# Patient Record
Sex: FEMALE | Race: WHITE | Employment: FULL TIME | URBAN - METROPOLITAN AREA
[De-identification: names, ages, dates, MRNs, and addresses within clinical notes are randomized per-mention and may not be internally consistent; named-entity substitution may affect disease eponyms.]

---

## 2020-03-23 ENCOUNTER — OFFICE VISIT (OUTPATIENT)
Dept: INTERNAL MEDICINE CLINIC | Age: 25
End: 2020-03-23
Payer: COMMERCIAL

## 2020-03-23 VITALS
HEART RATE: 75 BPM | TEMPERATURE: 98.6 F | OXYGEN SATURATION: 98 % | WEIGHT: 241 LBS | DIASTOLIC BLOOD PRESSURE: 84 MMHG | SYSTOLIC BLOOD PRESSURE: 118 MMHG | RESPIRATION RATE: 16 BRPM | HEIGHT: 67 IN | BODY MASS INDEX: 37.83 KG/M2

## 2020-03-23 PROBLEM — E66.9 OBESITY (BMI 35.0-39.9 WITHOUT COMORBIDITY): Status: ACTIVE | Noted: 2020-03-23

## 2020-03-23 PROCEDURE — 99204 OFFICE O/P NEW MOD 45 MIN: CPT | Performed by: INTERNAL MEDICINE

## 2020-03-23 RX ORDER — TOPIRAMATE 25 MG/1
25 TABLET ORAL NIGHTLY
Qty: 60 TABLET | Refills: 1 | Status: SHIPPED | OUTPATIENT
Start: 2020-03-23 | End: 2020-05-28

## 2020-03-23 SDOH — ECONOMIC STABILITY: TRANSPORTATION INSECURITY
IN THE PAST 12 MONTHS, HAS THE LACK OF TRANSPORTATION KEPT YOU FROM MEDICAL APPOINTMENTS OR FROM GETTING MEDICATIONS?: NO

## 2020-03-23 SDOH — ECONOMIC STABILITY: TRANSPORTATION INSECURITY
IN THE PAST 12 MONTHS, HAS LACK OF TRANSPORTATION KEPT YOU FROM MEETINGS, WORK, OR FROM GETTING THINGS NEEDED FOR DAILY LIVING?: NO

## 2020-03-23 SDOH — ECONOMIC STABILITY: INCOME INSECURITY: HOW HARD IS IT FOR YOU TO PAY FOR THE VERY BASICS LIKE FOOD, HOUSING, MEDICAL CARE, AND HEATING?: NOT HARD AT ALL

## 2020-03-23 SDOH — ECONOMIC STABILITY: FOOD INSECURITY: WITHIN THE PAST 12 MONTHS, THE FOOD YOU BOUGHT JUST DIDN'T LAST AND YOU DIDN'T HAVE MONEY TO GET MORE.: NEVER TRUE

## 2020-03-23 SDOH — ECONOMIC STABILITY: FOOD INSECURITY: WITHIN THE PAST 12 MONTHS, YOU WORRIED THAT YOUR FOOD WOULD RUN OUT BEFORE YOU GOT MONEY TO BUY MORE.: NEVER TRUE

## 2020-03-23 ASSESSMENT — ENCOUNTER SYMPTOMS
COUGH: 0
SORE THROAT: 0
APNEA: 0
BACK PAIN: 0
EYE PAIN: 0
SHORTNESS OF BREATH: 0
EYE DISCHARGE: 0

## 2020-03-23 ASSESSMENT — PATIENT HEALTH QUESTIONNAIRE - PHQ9
SUM OF ALL RESPONSES TO PHQ9 QUESTIONS 1 & 2: 0
SUM OF ALL RESPONSES TO PHQ QUESTIONS 1-9: 0
2. FEELING DOWN, DEPRESSED OR HOPELESS: 0
1. LITTLE INTEREST OR PLEASURE IN DOING THINGS: 0
SUM OF ALL RESPONSES TO PHQ QUESTIONS 1-9: 0

## 2020-03-23 NOTE — PROGRESS NOTES
3/23/2020    Gisselle Motta (:  1995) is a 30 y.o. female, here for evaluation of the following medical concerns:    HPI   The pt is a 24YOF with PMH of GERD, frequent uti's and migraine who is presenting today for the migraines. The pt says she has had them for 10 years. It is becoming more frequent now and is interfering with her daily life. She usually gets 1-2  attacks a week . It is a dull ache, associated with phono and photophobia as well as nausea. She also has spots in her vision and  time blurry vision which again is becoming more frequent. The pt is also concerned because her friend got diagnosed with a brain mass recently. She denies anxiety and depression    Review of Systems   Constitutional: Positive for activity change. Negative for chills, diaphoresis, fatigue and fever. HENT: Negative for congestion, hearing loss, nosebleeds and sore throat. Eyes: Negative for pain and discharge. Respiratory: Negative for apnea, cough and shortness of breath. Cardiovascular: Negative for chest pain and leg swelling. Endocrine: Negative for cold intolerance and heat intolerance. Genitourinary: Negative for dysuria and frequency. Musculoskeletal: Negative for arthralgias and back pain. Neurological: Positive for headaches. Negative for speech difficulty, light-headedness and numbness. Psychiatric/Behavioral: Negative for agitation, behavioral problems, confusion, dysphoric mood, sleep disturbance and suicidal ideas. All other systems reviewed and are negative. Prior to Visit Medications    Medication Sig Taking? Authorizing Provider   topiramate (TOPAMAX) 25 MG tablet Take 1 tablet by mouth nightly For 1 weeks and then take 2 tablets nightly after that. Yes Lisa Magana MD        No Known Allergies    Past Medical History:   Diagnosis Date    Eczema     Headache        History reviewed. No pertinent surgical history.     Social History     Tobacco Use    Smoking status:

## 2020-03-23 NOTE — PATIENT INSTRUCTIONS
Please take Topiramate 25 mg oral daily for a weeks and then take 50 mg oral after that  Please avoid taking daily NSAIDS  Please call the office if you fail to improve please call the office for further dose adjustment.

## 2020-05-28 RX ORDER — TOPIRAMATE 25 MG/1
TABLET ORAL
Qty: 60 TABLET | Refills: 2 | Status: SHIPPED | OUTPATIENT
Start: 2020-05-28 | End: 2020-10-15 | Stop reason: SINTOL

## 2020-06-19 ENCOUNTER — TELEPHONE (OUTPATIENT)
Dept: BARIATRICS/WEIGHT MGMT | Age: 25
End: 2020-06-19

## 2020-09-10 ENCOUNTER — TELEPHONE (OUTPATIENT)
Dept: BARIATRICS/WEIGHT MGMT | Age: 25
End: 2020-09-10

## 2020-10-15 ENCOUNTER — OFFICE VISIT (OUTPATIENT)
Dept: BARIATRICS/WEIGHT MGMT | Age: 25
End: 2020-10-15
Payer: COMMERCIAL

## 2020-10-15 VITALS
HEIGHT: 67 IN | SYSTOLIC BLOOD PRESSURE: 138 MMHG | WEIGHT: 246 LBS | BODY MASS INDEX: 38.61 KG/M2 | DIASTOLIC BLOOD PRESSURE: 103 MMHG | HEART RATE: 86 BPM

## 2020-10-15 PROCEDURE — 4004F PT TOBACCO SCREEN RCVD TLK: CPT | Performed by: SURGERY

## 2020-10-15 PROCEDURE — G8417 CALC BMI ABV UP PARAM F/U: HCPCS | Performed by: SURGERY

## 2020-10-15 PROCEDURE — G8484 FLU IMMUNIZE NO ADMIN: HCPCS | Performed by: SURGERY

## 2020-10-15 PROCEDURE — G8427 DOCREV CUR MEDS BY ELIG CLIN: HCPCS | Performed by: SURGERY

## 2020-10-15 PROCEDURE — 99203 OFFICE O/P NEW LOW 30 MIN: CPT | Performed by: SURGERY

## 2020-10-15 NOTE — PROGRESS NOTES
Jere Villarreal is a 30 y.o. female with a date of birth of 1995. Vitals:    10/15/20 0852   BP: (!) 138/103   Pulse: 86    BMI: Body mass index is 38.53 kg/m². Obesity Classification: Class II    Weight History: Wt Readings from Last 3 Encounters:   10/15/20 246 lb (111.6 kg)   03/23/20 241 lb (109.3 kg)     Patient's lowest adult weight was 150 lbs at age 21. Patient's highest adult weight was 260 lbs at age 23. Patient has participated in the following weight loss programs: healthier food options, exercising. Patient has not participated in meal replacement/liquid diets. Patient has participated in weight loss medications-OTC. Patient is not lactose intolerant. Patient does not have Shinto/cultural food concerns. Patient does not have food allergies. Patient does not tolerate artificial sweeteners. 24 hour recall/food frequency chart: Pt is a  and gets home late   Breakfast: 2 slices avocado toast with harris   Snack: Salad with ranch   Lunch: 1/2 sheri cheesesteak  Snack: not usually   Dinner: salmon fillet with 2 cups steamed broccoli and medium sweet potato   Snack: sometimes - cereal OR ice cream   Drinks throughout the day: Water, limited soda, hot tea, juice, 1% milk  Do you drink alcohol? Yes. How often/how much alcohol do you drink: 3 white claws/shot several times per week. Patient does meet the criteria for binge eating disorder. Patient does have grazing- at work. Patient does not have night eating. Patient does have a history of emotional eating or eating out of boredom. Pt does not exercise    Surgery  Pt reports she is only interested in non-surgical weight loss.      Goals  Weight: 175 lbs   Health Improvement: improve breathing, lost weight     Assessment  Nutritional Needs: RMR=(9.99 x 111.6) + (6.25 x 170.2) - (4.92 x 25 y.o.) -161= 1885 kcal x 1.4 (sedentary activity factor)= 2639 kcal - 1000 (for 2 lb weight loss/week)= 1639 kcal.    Plan  Plan/Recommendations: Start presurgical guidelines. Is pt interested interested in diet only? Yes, wants to start with diet and exercise   Is pt interested in wt loss medications? Open to   Is pt interested in optifast? Open to     Goals:   -Eat 4-5 times daily  -Avoid high fat and high sugar foods  -Include protein with all meals and snacks  -Avoid carbonation and caffeine  -Avoid calorie containing beverages  -Increase physical activity as tolerated    PES Statement:  Overweight/Obesity related to lack of exercise, sedentary lifestyle, unhealthy eating habits, and unsuccessful diet attempts as evidenced by BMI. Body mass index is 38.53 kg/m². Will follow up as necessary.     Mauricio Riley

## 2020-10-18 NOTE — PROGRESS NOTES
[facility]) Physicians   General & Laparoscopic Surgery  [facility]      HPI:    Olivia Lara is a very pleasant 30 y.o. obese female ,   Body mass index is 38.53 kg/m². And multiple medical problems who is presenting for weight loss surgery evaluation and consultation by Dr. Zakia Cates. Patient has been struggling for several years now with obesity. Patient feels the weight is an obstacle to achieve and perform things in daily living as well risk on health. Tries to diet, and exercise but can't keep the weight off. Patient  is very determined to lose weight and be healthy, and is interested in surgical weight loss to achieve this goal.    Otherwise patient denies any nausea, vomiting, fevers, chills, shortness of breath, chest pain, constipation or urinary symptoms. Pain Assessment   Denies any abdominal pain     Past Medical History:   Diagnosis Date    Eczema     Headache     Migraine     Preoperative clearance      No past surgical history on file. Family History   Problem Relation Age of Onset    High Blood Pressure Mother     Alcohol Abuse Father      Social History     Tobacco Use    Smoking status: Current Every Day Smoker     Types: Cigarettes    Smokeless tobacco: Never Used   Substance Use Topics    Alcohol use: Yes     I counseled the patient on the importance of not smoking and risks of ETOH. No Known Allergies  Vitals:    10/15/20 0852   BP: (!) 138/103   Pulse: 86   Weight: 246 lb (111.6 kg)   Height: 5' 7\" (1.702 m)       Body mass index is 38.53 kg/m². No current outpatient medications on file.       Review of Systems - History obtained from the patient  General ROS: negative  Psychological ROS: negative  Ophthalmic ROS: negative  Neurological ROS: negative  ENT ROS: negative  Allergy and Immunology ROS: negative  Hematological and Lymphatic ROS: negative  Endocrine ROS: negative  Respiratory ROS: negative  Cardiovascular ROS: negative  Gastrointestinal ROS:negative  Genito-Urinary ROS: negative  Musculoskeletal ROS: negative   Skin ROS: negative    Physical Exam   Constitutional: Patient is oriented to person, place, and time. Vital signs are normal. Patient  appears well-developed and well-nourished. Patient  is active and cooperative. Non-toxic appearance. No distress. HENT:   Head: Normocephalic and atraumatic. Head is without laceration. Right Ear: External ear normal. No lacerations. No drainage, swelling or tenderness. Left Ear: External ear normal. No lacerations. No drainage, swelling or tenderness. Nose: Nose normal. No nose lacerations or nasal deformity. Mouth/Throat: Uvula is midline, oropharynx is clear and moist and mucous membranes are normal. No oropharyngeal exudate. Eyes: Conjunctivae, EOM and lids are normal. Pupils are equal, round, and reactive to light. Right eye exhibits no discharge. No foreign body present in the right eye. Left eye exhibits no discharge. No foreign body present in the left eye. No scleral icterus. Neck: Trachea normal and normal range of motion. Neck supple. No JVD present. No tracheal tenderness present. Carotid bruit is not present. No rigidity. No tracheal deviation and no edema present. No thyromegaly present. Cardiovascular: Normal rate, regular rhythm, normal heart sounds, intact distal pulses and normal pulses. Pulmonary/Chest: Effort normal and breath sounds normal. No stridor. No respiratory distress. Patient  has no wheezes. Patient has no rales. Patient exhibits no tenderness and no crepitus. Abdominal: Soft. Normal appearance and bowel sounds are normal. Patient exhibits no distension, no abdominal bruit, no ascites and no mass. There is no hepatosplenomegaly. There is no tenderness. There is no rigidity, no rebound, no guarding and no CVA tenderness. No hernia. Hernia confirmed negative in the ventral area. Musculoskeletal: Normal range of motion.  Patient exhibits no edema or tenderness. Lymphadenopathy:        Head (right side): No submental, no submandibular, no preauricular, no posterior auricular and no occipital adenopathy present. Head (left side): No submental, no submandibular, no preauricular, no posterior auricular and no occipital adenopathy present. Patient  has no cervical adenopathy. Right: No supraclavicular adenopathy present. Left: No supraclavicular adenopathy present. Neurological: Patient is alert and oriented to person, place, and time. Patient has normal strength. Coordination and gait normal. GCS eye subscore is 4. GCS verbal subscore is 5. GCS motor subscore is 6. Skin: Skin is warm and dry. No abrasion and no rash noted. Patient  is not diaphoretic. No cyanosis or erythema. Psychiatric: Patient has a normal mood and affect. speech is normal and behavior is normal. Cognition and memory are normal.             A/P  Linette Leavitt is a very pleasant 30 y.o. female with Obesity,  Body mass index is 38.53 kg/m². and multiple obesity related co-morbidities. Linette Leavitt is very motivated to lose weight and being more healthy. We discussed how her weight affects her overall health including:  Tamika was seen today for bariatric, initial visit. Diagnoses and all orders for this visit:    Obesity (BMI 35.0-39.9 without comorbidity)    Migraine without aura and with status migrainosus, not intractable    Preoperative clearance      The patient underwent 30 minutes of dietary counseling. I have reviewed, discussed and agree with the dietary plan. Medical weight loss and different surgical options were discussed in details with patient. Linette Leavitt is interested in medical weight loss.     Will refer to non-surgical weight loss    [employer]

## 2020-11-03 ENCOUNTER — TELEPHONE (OUTPATIENT)
Dept: BARIATRICS/WEIGHT MGMT | Age: 25
End: 2020-11-03

## 2020-11-03 ENCOUNTER — TELEMEDICINE (OUTPATIENT)
Dept: BARIATRICS/WEIGHT MGMT | Age: 25
End: 2020-11-03
Payer: COMMERCIAL

## 2020-11-03 PROCEDURE — 99203 OFFICE O/P NEW LOW 30 MIN: CPT | Performed by: FAMILY MEDICINE

## 2020-11-03 ASSESSMENT — ENCOUNTER SYMPTOMS
PHOTOPHOBIA: 0
CHEST TIGHTNESS: 0
SHORTNESS OF BREATH: 0
CHOKING: 0
ABDOMINAL PAIN: 0
COUGH: 0
DIARRHEA: 0
BLOOD IN STOOL: 0
CONSTIPATION: 0
APNEA: 0
EYE PAIN: 0
ABDOMINAL DISTENTION: 0
VOMITING: 0
NAUSEA: 0
WHEEZING: 0

## 2020-11-03 NOTE — TELEPHONE ENCOUNTER
----- Message from Laurie Zayas MD sent at 11/3/2020 10:17 AM EST -----  Regardin-Christiano/low carb Meal Plan  Good morning team,    Can someone please reach out to [facility] and start her on the 1200-Hcristiano/LC meal plan and go over general low carb guidelines with her? Thank you!

## 2020-11-03 NOTE — PROGRESS NOTES
Patient: Driss Mon     Encounter Date: 11/3/2020    YOB: 1995               Age: 30 y.o. Patient identification was verified at the start of the visit. Patient-Reported Vitals 11/3/2020   Patient-Reported Weight 240   Patient-Reported Height 5'7\"   Patient-Reported Temperature 96.6         BP Readings from Last 1 Encounters:   10/15/20 (!) 138/103       BMI Readings from Last 1 Encounters:   10/15/20 38.53 kg/m²       Pulse Readings from Last 1 Encounters:   10/15/20 86                                          Wt Readings from Last 3 Encounters:   10/15/20 246 lb (111.6 kg)   03/23/20 241 lb (109.3 kg)       Chief Complaint   Patient presents with    Weight Management     Initial [facility]       HPI:    30 y.o. female presents to \[facility]\"" care via video visit as referred by Dr. Darien Xie. The patient's medical history is significant for class II obesity. The patient has a long-standing history of obesity which started gradually. The problem is moderate. The patient has been gaining weight. Risk factors include annual weight gain of >2 lbs (1 kg)/ year and sedentary lifestyle. Aggravating factors include poor diet and lack of physical activity. The patient has tried various diet/exercise plans which have been ineffective in the long-run. She is motivated to start losing weight to help improve her overall health. When did you become overweight? [] Childhood   [] Teens   [x] Adulthood   [] Pregnancy   [] Menopause    Highest adult weight: 260 pounds at age 23    Triggers for weight gain? [] Stress   [] Illness   [] Medications   [] Travel  []Injury     [] Nightshift work   [] Insomnia  [x] No specific triggers   [] Other    Food triggers:   [x] Stress   [x] Boredom   [] Fast food   [x] Eating out   [] Seeking reward   [] Social     Have you ever taken medications to help you lose weight?    [x] Yes- OTC  [] No    Have you ever been on a meal replacement program?  [] Yes  [x] No    The patient denies any significant cardiac or psychiatric disease. The patient denies a history thyroid disease. The patient denies a history of glaucoma. The patient denies a history of seizure disorders/epiliepsy. Dietitian's assessment reviewed and addressed with patient. Reviewed:  [x] Nutrition and the importance of regular protein intake  [x] Hidden CHO/carbohydrate sources  [x] Alcohol use  [x] Tobacco use  [x] Drug use- Denies   [x] Importance of exercise and reducing sedentary time  [x] PHQ-2      Controlled Substance Monitoring:     No flowsheet data found. No Known Allergies    No current outpatient medications on file. Patient Active Problem List   Diagnosis    Obesity (BMI 35.0-39.9 without comorbidity)    Migraine    Preoperative clearance       Past Medical History:   Diagnosis Date    Eczema     Headache     Migraine     Preoperative clearance        History reviewed. No pertinent surgical history. Family History   Problem Relation Age of Onset    High Blood Pressure Mother     Alcohol Abuse Father        Review of Systems   Constitutional: Negative for fatigue. Eyes: Negative for photophobia, pain and visual disturbance. Respiratory: Negative for apnea, cough, choking, chest tightness, shortness of breath and wheezing. Cardiovascular: Negative for chest pain, palpitations and leg swelling. Gastrointestinal: Negative for abdominal distention, abdominal pain, blood in stool, constipation, diarrhea, nausea and vomiting. Endocrine: Negative for cold intolerance and heat intolerance. Musculoskeletal: Negative for arthralgias and myalgias. Skin: Negative for rash. Neurological: Negative for dizziness, tremors, syncope, weakness, numbness and headaches. Psychiatric/Behavioral: Negative for agitation, confusion, decreased concentration, dysphoric mood, hallucinations, sleep disturbance and suicidal ideas.  The patient is not nervous/anxious and is not hyperactive. Physical Exam  Constitutional:       Appearance: She is well-developed. HENT:      Head: Normocephalic. Eyes:      Conjunctiva/sclera: Conjunctivae normal.   Abdominal:      General: Abdomen is protuberant. Musculoskeletal:         General: No swelling. Neurological:      Mental Status: She is alert and oriented to person, place, and time. Psychiatric:         Mood and Affect: Mood normal.         Behavior: Behavior normal.         Thought Content: Thought content normal.         Judgment: Judgment normal.         No results found for any previous visit. Assessment and Plan:    1. Class 2 obesity    Heavily counseled on the importance of therapeutic lifestyle changes through diet and exercise. The patient understands that the goal of treatment is to reach and stay at a healthy weight. The initial treatment goal is to lose at least 5-10% of her body weight in 12 weeks. This will require changes in eating habits, increased physical activity, and behavior changes. Counseled on low carb diet. Patient handouts and education material will be emailed and reviewed by the dietitian. All questions answered. Encouraged patient to keep a food journal and to bring it to her next visit. Discussed available treatment options in addition to lifestyle changes including medications or OPTIFAST. Anti-obesity medications/OPTIFAST may be recommended at the next visit depending on her progress and lab results. she understands that medications/meal replacements are most effective as part of a comprehensive treatment plan that includes nutrition, physical activity, and behavior modification. The patient also understands that she will need close follow-ups every 2-4 weeks if she starts treatment. Follow-up in 2 weeks to discuss lab results and treatment plan. Patient advised that it is her responsibility to follow up on any ordered tests/lab results. [employer] access at next visit.  Patient understands and agrees with the plan. - Comprehensive Metabolic Panel; Future  - EKG 12 Lead; Future  - Hemoglobin A1C; Future  - Lipid Panel; Future  - TSH with Reflex; Future  - Vitamin B12 & Folate; Future  - Vitamin D 25 Hydroxy; Future    2. Dietary counseling and surveillance  1200-Christiano/low carb meal plan    3. High risk medications (not anticoagulants) long-term use    - EKG 12 Lead; Future    4. Elevated BP without diagnosis of hypertension     Patient will follow-up with PCP. A 5-10% weight loss can also help improve this. Nutrition:  [] LCHF/Ketogenic [x] Low carb/low-calorie diet [] Low-calorie diet     []Maintenance        FITTE:   [x] Cardio [x] Resistance/stength exercises   [x] ACSM recommendations (150 minutes/week)           Behavior:   [x] Motivational interviewing performed    [] Referral for counseling  [x] Discussed strategies to overcome habits/challenges for focus      [x] Stress management   [x] Stimulus control  [x] Sleep hygiene      Orders Placed This Encounter   Procedures    Comprehensive Metabolic Panel     Standing Status:   Future     Standing Expiration Date:   11/3/2021    Hemoglobin A1C     Standing Status:   Future     Standing Expiration Date:   11/3/2021    Lipid Panel     Standing Status:   Future     Standing Expiration Date:   1/3/2021     Order Specific Question:   Is Patient Fasting?/# of Hours     Answer:   8 hours    TSH with Reflex     Standing Status:   Future     Standing Expiration Date:   11/3/2021    Vitamin B12 & Folate     Standing Status:   Future     Standing Expiration Date:   11/3/2021    Vitamin D 25 Hydroxy     Standing Status:   Future     Standing Expiration Date:   11/3/2021    EKG 12 Lead     Standing Status:   Future     Standing Expiration Date:   11/3/2021     Order Specific Question:   Reason for Exam?     Answer: Other       No follow-ups on file.     Briana Ellis is a 30 y.o. female being evaluated by a Virtual Visit (video visit) encounter to address concerns as mentioned above. A caregiver was present when appropriate. Due to this being a TeleHealth encounter (During HEZIU-06 public health emergency), evaluation of the following organ systems was limited: Vitals/Constitutional/EENT/Resp/CV/GI//MS/Neuro/Skin/Heme-Lymph-Imm. Pursuant to the emergency declaration under the [address] [place] [address] and the [employer] Act, this Virtual Visit was conducted with patient's (and/or legal guardian's) consent, to reduce the patient's risk of exposure to COVID-19 and provide necessary medical care. The patient (and/or legal guardian) has also been advised to contact this office for worsening conditions or problems, and seek emergency medical treatment and/or call 911 if deemed necessary. Services were provided through a video synchronous discussion virtually to substitute for in-person clinic visit. Patient and provider were located at their individual homes. --Milo Cervantes MD on 11/3/2020 at 10:42 AM    An electronic signature was used to authenticate this note.

## 2020-11-03 NOTE — TELEPHONE ENCOUNTER
Verified email and sent 1200 kcal LC plan to pt, reviewed with pt over phone and pt verbalized understanding.

## 2020-11-13 ENCOUNTER — HOSPITAL ENCOUNTER (OUTPATIENT)
Age: 25
Discharge: HOME | End: 2020-11-13
Payer: COMMERCIAL

## 2020-11-16 ENCOUNTER — HOSPITAL ENCOUNTER (OUTPATIENT)
Age: 25
Discharge: HOME | End: 2020-11-16
Payer: COMMERCIAL

## 2020-11-16 LAB
A/G RATIO: 1.7 (ref 1.1–2.2)
ALBUMIN SERPL-MCNC: 4.5 G/DL (ref 3.4–5)
ALP BLD-CCNC: 58 U/L (ref 40–129)
ALT SERPL-CCNC: 40 U/L (ref 10–40)
ANION GAP SERPL CALCULATED.3IONS-SCNC: 13 MMOL/L (ref 3–16)
AST SERPL-CCNC: 33 U/L (ref 15–37)
BILIRUB SERPL-MCNC: 0.6 MG/DL (ref 0–1)
BUN BLDV-MCNC: 12 MG/DL (ref 7–20)
CALCIUM SERPL-MCNC: 10 MG/DL (ref 8.3–10.6)
CHLORIDE BLD-SCNC: 101 MMOL/L (ref 99–110)
CHOLESTEROL, TOTAL: 149 MG/DL (ref 0–199)
CO2: 24 MMOL/L (ref 21–32)
CREAT SERPL-MCNC: 0.7 MG/DL (ref 0.6–1.1)
EKG ATRIAL RATE: 76 BPM
EKG DIAGNOSIS: NORMAL
EKG P AXIS: 38 DEGREES
EKG P-R INTERVAL: 148 MS
EKG Q-T INTERVAL: 376 MS
EKG QRS DURATION: 84 MS
EKG QTC CALCULATION (BAZETT): 423 MS
EKG R AXIS: -8 DEGREES
EKG T AXIS: 19 DEGREES
EKG VENTRICULAR RATE: 76 BPM
ESTIMATED AVERAGE GLUCOSE: 93.9 MG/DL
FOLATE: 5.27 NG/ML (ref 4.78–24.2)
GFR AFRICAN AMERICAN: >60
GFR NON-AFRICAN AMERICAN: >60
GLOBULIN: 2.7 G/DL
GLUCOSE BLD-MCNC: 87 MG/DL (ref 70–99)
HBA1C MFR BLD: 4.9 %
HDLC SERPL-MCNC: 40 MG/DL (ref 40–60)
LDL CHOLESTEROL CALCULATED: ABNORMAL MG/DL
LDL CHOLESTEROL DIRECT: 63 MG/DL
POTASSIUM SERPL-SCNC: 3.9 MMOL/L (ref 3.5–5.1)
SODIUM BLD-SCNC: 138 MMOL/L (ref 136–145)
TOTAL PROTEIN: 7.2 G/DL (ref 6.4–8.2)
TRIGL SERPL-MCNC: 481 MG/DL (ref 0–150)
TSH REFLEX: 3.31 UIU/ML (ref 0.27–4.2)
VITAMIN B-12: 596 PG/ML (ref 211–911)
VITAMIN D 25-HYDROXY: 15.5 NG/ML
VLDLC SERPL CALC-MCNC: ABNORMAL MG/DL

## 2020-11-16 PROCEDURE — 36415 COLL VENOUS BLD VENIPUNCTURE: CPT

## 2020-11-16 PROCEDURE — 83721 ASSAY OF BLOOD LIPOPROTEIN: CPT

## 2020-11-16 PROCEDURE — 82607 VITAMIN B-12: CPT

## 2020-11-16 PROCEDURE — 82306 VITAMIN D 25 HYDROXY: CPT

## 2020-11-16 PROCEDURE — 83036 HEMOGLOBIN GLYCOSYLATED A1C: CPT

## 2020-11-16 PROCEDURE — 93010 ELECTROCARDIOGRAM REPORT: CPT | Performed by: INTERNAL MEDICINE

## 2020-11-16 PROCEDURE — 82746 ASSAY OF FOLIC ACID SERUM: CPT

## 2020-11-16 PROCEDURE — 80053 COMPREHEN METABOLIC PANEL: CPT

## 2020-11-16 PROCEDURE — 93005 ELECTROCARDIOGRAM TRACING: CPT

## 2020-11-16 PROCEDURE — 80061 LIPID PANEL: CPT

## 2020-11-16 PROCEDURE — 84443 ASSAY THYROID STIM HORMONE: CPT

## 2020-11-17 ENCOUNTER — TELEMEDICINE (OUTPATIENT)
Dept: BARIATRICS/WEIGHT MGMT | Age: 25
End: 2020-11-17
Payer: COMMERCIAL

## 2020-11-17 PROCEDURE — 99214 OFFICE O/P EST MOD 30 MIN: CPT | Performed by: FAMILY MEDICINE

## 2020-11-17 RX ORDER — ERGOCALCIFEROL 1.25 MG/1
50000 CAPSULE ORAL WEEKLY
Qty: 8 CAPSULE | Refills: 0 | Status: SHIPPED | OUTPATIENT
Start: 2020-11-17 | End: 2021-01-28 | Stop reason: SDUPTHER

## 2020-11-17 ASSESSMENT — ENCOUNTER SYMPTOMS
BLOOD IN STOOL: 0
NAUSEA: 0
SHORTNESS OF BREATH: 0
ABDOMINAL PAIN: 0
COUGH: 0
ABDOMINAL DISTENTION: 0
VOMITING: 0
WHEEZING: 0
APNEA: 0
PHOTOPHOBIA: 0
CHEST TIGHTNESS: 0
EYE PAIN: 0
CHOKING: 0
CONSTIPATION: 0
DIARRHEA: 0

## 2020-11-17 NOTE — PROGRESS NOTES
Patient: Pankaj David                      Encounter Date: 11/17/2020    YOB: 1995                Age: 30 y.o. Chief Complaint   Patient presents with    Weight Management     2nd Visit          Patient identification was verified at the start of the visit. Patient-Reported Vitals 11/17/2020   Patient-Reported Weight 240   Patient-Reported Height 5'7   Patient-Reported Temperature -         BP Readings from Last 1 Encounters:   11/19/20 125/84       BMI Readings from Last 1 Encounters:   11/19/20 37.50 kg/m²       Pulse Readings from Last 1 Encounters:   11/19/20 80            HPI: 30 y.o. female with a long-standing history of obesity presents today for virtual video follow-up. Her weight is stable  since her last visit. Current treatment includes 1200-Christiano/low carb meal plan. Food recall reviewed with the patient. Hasn't been following it consistently, but making better dietary choices. Recent labs and EKG reviewed with patient.      Triglycerides 481 (h/o pancreatitis)   Vit D 15.5      Exercise: []Cardio     []Resistance/strength training     [x]Other: Limited     No Known Allergies      Current Outpatient Medications:     vitamin D (ERGOCALCIFEROL) 1.25 MG (46548 UT) CAPS capsule, Take 1 capsule by mouth once a week for 8 doses, Disp: 8 capsule, Rfl: 0    fenofibrate (TRICOR) 48 MG tablet, Take 1 tablet by mouth daily for 2 weeks and then increase to 2 tablets daily, Disp: 60 tablet, Rfl: 2    Omega-3 Fatty Acids (FISH OIL) 1000 MG CAPS, Take 1 capsule by mouth 3 times daily, Disp: 90 capsule, Rfl: 3    fluticasone (FLONASE) 50 MCG/ACT nasal spray, 2 sprays by Each Nostril route daily, Disp: 3 Bottle, Rfl: 1    Patient Active Problem List   Diagnosis    Obesity (BMI 35.0-39.9 without comorbidity)    Migraine    Hypertriglyceridemia    Seasonal allergic rhinitis due to pollen    Congenital anomaly of ureter    Difficulty concentrating    Easy bruising    Insomnia    Myopia  Pancreatitis    Regular astigmatism       Review of Systems   Constitutional: Negative for fatigue. Eyes: Negative for photophobia, pain and visual disturbance. Respiratory: Negative for apnea, cough, choking, chest tightness, shortness of breath and wheezing. Cardiovascular: Negative for chest pain, palpitations and leg swelling. Gastrointestinal: Negative for abdominal distention, abdominal pain, blood in stool, constipation, diarrhea, nausea and vomiting. Endocrine: Negative for cold intolerance and heat intolerance. Musculoskeletal: Negative for arthralgias and myalgias. Skin: Negative for rash. Neurological: Negative for dizziness, tremors, syncope, weakness, numbness and headaches. Psychiatric/Behavioral: Negative for agitation, confusion, decreased concentration, dysphoric mood, hallucinations, sleep disturbance and suicidal ideas. The patient is not nervous/anxious and is not hyperactive. Physical Exam  Constitutional:       Appearance: She is well-developed. HENT:      Head: Normocephalic. Eyes:      Conjunctiva/sclera: Conjunctivae normal.   Abdominal:      General: Abdomen is protuberant. Musculoskeletal:         General: No swelling. Neurological:      Mental Status: She is alert and oriented to person, place, and time. Psychiatric:         Mood and Affect: Mood normal.         Behavior: Behavior normal.         Thought Content:  Thought content normal.         Judgment: Judgment normal.         Hospital Outpatient Visit on 11/16/2020   Component Date Value Ref Range Status    Sodium 11/16/2020 138  136 - 145 mmol/L Final    Potassium 11/16/2020 3.9  3.5 - 5.1 mmol/L Final    Chloride 11/16/2020 101  99 - 110 mmol/L Final    CO2 11/16/2020 24  21 - 32 mmol/L Final    Anion Gap 11/16/2020 13  3 - 16 Final    Glucose 11/16/2020 87  70 - 99 mg/dL Final    BUN 11/16/2020 12  7 - 20 mg/dL Final    CREATININE 11/16/2020 0.7  0.6 - 1.1 mg/dL Final    GFR Non- 11/16/2020 >60  >60 Final    Comment: >60 mL/min/1.73m2 EGFR, calc. for ages 25 and older using the  MDRD formula (not corrected for weight), is valid for stable  renal function.  GFR  11/16/2020 >60  >60 Final    Comment: Chronic Kidney Disease: less than 60 ml/min/1.73 sq.m. Kidney Failure: less than 15 ml/min/1.73 sq.m. Results valid for patients 18 years and older.  Calcium 11/16/2020 10.0  8.3 - 10.6 mg/dL Final    Total Protein 11/16/2020 7.2  6.4 - 8.2 g/dL Final    Alb 11/16/2020 4.5  3.4 - 5.0 g/dL Final    Albumin/Globulin Ratio 11/16/2020 1.7  1.1 - 2.2 Final    Total Bilirubin 11/16/2020 0.6  0.0 - 1.0 mg/dL Final    Alkaline Phosphatase 11/16/2020 58  40 - 129 U/L Final    ALT 11/16/2020 40  10 - 40 U/L Final    AST 11/16/2020 33  15 - 37 U/L Final    Globulin 11/16/2020 2.7  g/dL Final    Vit D, 25-Hydroxy 11/16/2020 15.5* >=30 ng/mL Final    Comment: <=20 ng/mL. ........... [facility] [place] Deficient  21-29 ng/mL. ......... [facility] [place] Insufficient  >=30 ng/mL. ........ [facility] Sufficient      Vitamin B-12 11/16/2020 596  211 - 911 pg/mL Final    Folate 11/16/2020 5.27  4.78 - 24.20 ng/mL Final    Comment: Effective 11-15-16 10:00am EST  Please note reference ranges have  changed for Folate.  TSH 11/16/2020 3.31  0.27 - 4.20 uIU/mL Final    Cholesterol, Total 11/16/2020 149  0 - 199 mg/dL Final    Triglycerides 11/16/2020 481* 0 - 150 mg/dL Final    HDL 11/16/2020 40  40 - 60 mg/dL Final    LDL Calculated 11/16/2020 see below  <100 mg/dL Final    Comment: When the triglyceride is <30 mg/dL or >300 mg/dL the  calculated LDL and  VLDL are not valid and a measured  LDL is performed.  VLDL Cholesterol Calculated 11/16/2020 see below  Not Established mg/dL Final    Comment: When the triglyceride is <30 mg/dL or >300 mg/dL the  calculated LDL and  VLDL are not valid and a measured  LDL is performed.       Hemoglobin A1C 11/16/2020 4.9  See comment % Final habits/challenges for focus         [] Stress management   [x] Stimulus control         [] Sleep hygiene      Reviewed:  [x] Nutrition and the importance of regular protein intake  [x] Hidden carbohydrate sources  [x] Alcohol use  [x] Tobacco use   [x] Drug use- Denies  [x] Importance of exercise and reducingsedentary time  [x] Treatment consent- Patient understands and agrees with the treatment plan   [x] Proper use of medication and side effects  [x] Labs  [x] EKG         Key dietary points:    - Meats (preferably organic or grass fed) are great sources of protein and have no carbohydrates. - Recommend coconut, olive, avocado, or almond oils. - When buying dairy, choose regular or full fat options. - Choose vegetables that grow above ground as they are generally lower in carbohydrates and higher in fiber.  - Avoid starches such as bread, rice, potatoes, pasta and all sources of simple sugars (desserts, soda, breakfast cereals). - Choose beverages that are calorie and sugar free. No orders of the defined types were placed in this encounter. No follow-ups on file. Matthwe Welch is a 30 y.o. female being evaluated by a Virtual Visit (video visit) encounter to address concerns as mentioned above. A caregiver was present when appropriate. Due to this being a TeleHealth encounter (During SXRDJ-76 public health emergency), evaluation of the following organ systems was limited: Vitals/Constitutional/EENT/Resp/CV/GI//MS/Neuro/Skin/Heme-Lymph-Imm. Pursuant to the emergency declaration under the [facility], [address] [place] [address] authority and the [employer] Act, this Virtual Visit was conducted with patient's (and/or legal guardian's) consent, to reduce the patient's risk of exposure to COVID-19 and provide necessary medical care.   The patient (and/or legal guardian) has also been advised to contact this office for worsening conditions or problems, and seek emergency medical treatment and/or call 911 if deemed necessary.

## 2020-11-19 ENCOUNTER — OFFICE VISIT (OUTPATIENT)
Dept: INTERNAL MEDICINE CLINIC | Age: 25
End: 2020-11-19
Payer: COMMERCIAL

## 2020-11-19 VITALS
SYSTOLIC BLOOD PRESSURE: 125 MMHG | RESPIRATION RATE: 16 BRPM | WEIGHT: 239.4 LBS | HEIGHT: 67 IN | BODY MASS INDEX: 37.57 KG/M2 | OXYGEN SATURATION: 98 % | HEART RATE: 80 BPM | DIASTOLIC BLOOD PRESSURE: 84 MMHG | TEMPERATURE: 98.1 F

## 2020-11-19 PROBLEM — J30.1 SEASONAL ALLERGIC RHINITIS DUE TO POLLEN: Status: ACTIVE | Noted: 2020-11-19

## 2020-11-19 PROBLEM — E78.1 HYPERTRIGLYCERIDEMIA: Status: ACTIVE | Noted: 2020-11-19

## 2020-11-19 PROCEDURE — G8484 FLU IMMUNIZE NO ADMIN: HCPCS | Performed by: INTERNAL MEDICINE

## 2020-11-19 PROCEDURE — G8417 CALC BMI ABV UP PARAM F/U: HCPCS | Performed by: INTERNAL MEDICINE

## 2020-11-19 PROCEDURE — 4004F PT TOBACCO SCREEN RCVD TLK: CPT | Performed by: INTERNAL MEDICINE

## 2020-11-19 PROCEDURE — G8427 DOCREV CUR MEDS BY ELIG CLIN: HCPCS | Performed by: INTERNAL MEDICINE

## 2020-11-19 PROCEDURE — 99214 OFFICE O/P EST MOD 30 MIN: CPT | Performed by: INTERNAL MEDICINE

## 2020-11-19 RX ORDER — FLUTICASONE PROPIONATE 50 MCG
2 SPRAY, SUSPENSION (ML) NASAL DAILY
Qty: 3 BOTTLE | Refills: 1 | Status: SHIPPED | OUTPATIENT
Start: 2020-11-19 | End: 2022-01-17

## 2020-11-19 RX ORDER — FENOFIBRATE 48 MG/1
TABLET, COATED ORAL
Qty: 60 TABLET | Refills: 2 | Status: SHIPPED | OUTPATIENT
Start: 2020-11-19 | End: 2020-12-14 | Stop reason: SDUPTHER

## 2020-11-19 RX ORDER — CHLORAL HYDRATE 500 MG
1000 CAPSULE ORAL 3 TIMES DAILY
Qty: 90 CAPSULE | Refills: 3 | Status: SHIPPED | OUTPATIENT
Start: 2020-11-19 | End: 2022-01-17

## 2020-11-19 ASSESSMENT — ENCOUNTER SYMPTOMS
VOMITING: 0
SINUS PAIN: 0
EYE DISCHARGE: 0
WHEEZING: 0
DIARRHEA: 0
SHORTNESS OF BREATH: 0
ABDOMINAL PAIN: 0
PHOTOPHOBIA: 0
EYE PAIN: 0
BLOOD IN STOOL: 0
RHINORRHEA: 1
CONSTIPATION: 0
COUGH: 0
CHEST TIGHTNESS: 0
ABDOMINAL DISTENTION: 0
NAUSEA: 0
BACK PAIN: 0

## 2020-11-19 NOTE — PROGRESS NOTES
2020    Johanna Porras (:  1995) is a 30 y.o. female, here for evaluation of the following medical concerns:    Chief Complaint   Patient presents with    Hyperlipidemia       HPI  The pt is a 33YOF with PMH of GERD, frequent uti's and migraine who is presenting today for the migraines. [facility]  She is a . She is following a meal plan, She is planning to get member ship to the gym. Pure hypertriglycerredemia  This is a new problem worsening. The pt was not on medication for it. Exacerbating factors include diseases include obesity and diet. Factors aggravating his hyperlipidemia include fatty foods. Pertinent negatives include no chest pain, focal sensory loss, focal weakness, leg pain, myalgias or shortness of breath. Compliance problems include adherence to diet and adherence to exercise. Risk factors for coronary artery disease include  dyslipidemia, obesity, , stress and a sedentary lifestyle. Allergic rhinitis new problem worsening  She has not had this in the past but for the pas month she has been having runny nose. No sore throat, fevers, chills, post nasal drip, sinus pressure or icthy eyes. She has tried OTC with some affect. No new pets  Review of Systems   Constitutional: Negative for activity change, appetite change, chills, fatigue, fever and unexpected weight change. HENT: Positive for rhinorrhea. Negative for congestion, ear discharge, ear pain, mouth sores, nosebleeds, sinus pain and sneezing. Eyes: Negative for photophobia, pain, discharge and visual disturbance. Respiratory: Negative for cough, chest tightness, shortness of breath and wheezing. Cardiovascular: Negative for chest pain, palpitations and leg swelling. Gastrointestinal: Negative for abdominal distention, abdominal pain, blood in stool, constipation, diarrhea, nausea and vomiting. Endocrine: Negative for polydipsia, polyphagia and polyuria. Genitourinary: Negative for dysuria and flank pain. Musculoskeletal: Negative for back pain and gait problem. Skin: Negative for pallor, rash and wound. Neurological: Negative for dizziness, tremors, facial asymmetry, speech difficulty, weakness, numbness and headaches. Psychiatric/Behavioral: Negative for agitation, confusion, dysphoric mood, self-injury and suicidal ideas. The patient is not nervous/anxious. All other systems reviewed and are negative. Prior to Visit Medications    Medication Sig Taking? Authorizing Provider   fenofibrate (TRICOR) 48 MG tablet Take 1 tablet by mouth daily for 2 weeks and then increase to 2 tablets daily Yes Zakia Cates MD   Omega-3 Fatty Acids (FISH OIL) 1000 MG CAPS Take 1 capsule by mouth 3 times daily Yes Zakia Cates MD   fluticasone (FLONASE) 50 MCG/ACT nasal spray 2 sprays by Each Nostril route daily Yes Zakia Cates MD   vitamin D (ERGOCALCIFEROL) 1.25 MG (22838 UT) CAPS capsule Take 1 capsule by mouth once a week for 8 doses Yes Michael Barros MD        No Known Allergies    Past Medical History:   Diagnosis Date    Eczema     Headache     Migraine     Preoperative clearance        History reviewed. No pertinent surgical history. Social History     Tobacco Use    Smoking status: Current Every Day Smoker     Types: Cigarettes    Smokeless tobacco: Never Used   Substance Use Topics    Alcohol use: Yes    Drug use: Yes     Types: Marijuana         Family History   Problem Relation Age of Onset    High Blood Pressure Mother     Alcohol Abuse Father        Vitals:    11/19/20 1430   BP: 125/84   Site: Left Upper Arm   Position: Sitting   Cuff Size: Large Adult   Pulse: 80   Resp: 16   Temp: 98.1 °F (36.7 °C)   SpO2: 98%   Weight: 239 lb 6.4 oz (108.6 kg)   Height: 5' 7\" (1.702 m)       Estimated body mass index is 37.5 kg/m² as calculated from the following:    Height as of this encounter: 5' 7\" (1.702 m).     Weight as of this encounter: 239 lb 6.4 oz (108.6 kg). Physical Exam  Vitals signs and nursing note reviewed. Constitutional:       Appearance: Normal appearance. She is normal weight. HENT:      Head: Normocephalic and atraumatic. Right Ear: Tympanic membrane, ear canal and external ear normal. There is no impacted cerumen. Left Ear: Tympanic membrane, ear canal and external ear normal. There is no impacted cerumen. Nose: Congestion and rhinorrhea present. Mouth/Throat:      Mouth: Mucous membranes are moist.   Eyes:      Extraocular Movements: Extraocular movements intact. Pupils: Pupils are equal, round, and reactive to light. Neck:      Musculoskeletal: Normal range of motion and neck supple. Cardiovascular:      Rate and Rhythm: Normal rate and regular rhythm. Pulses: Normal pulses. Heart sounds: Normal heart sounds. Pulmonary:      Effort: Pulmonary effort is normal. No respiratory distress. Breath sounds: Normal breath sounds. No wheezing. Abdominal:      General: Bowel sounds are normal. There is distension. Palpations: Abdomen is soft. There is no mass. Tenderness: There is no abdominal tenderness. Musculoskeletal: Normal range of motion. General: No swelling or tenderness. Skin:     General: Skin is warm and dry. Coloration: Skin is not jaundiced or pale. Findings: No rash. Neurological:      General: No focal deficit present. Mental Status: She is alert and oriented to person, place, and time. Sensory: No sensory deficit. Motor: No weakness. Psychiatric:         Mood and Affect: Mood normal.         Behavior: Behavior normal.         Thought Content: Thought content normal.         Judgment: Judgment normal.         ASSESSMENT/PLAN:  1. Hypertriglyceridemia  Started on tircor low dose and then increase to 2 tablets daily after that    2.  Seasonal allergic rhinitis due to pollen  flonase started      Orders Placed This

## 2020-11-30 RX ORDER — CETIRIZINE HYDROCHLORIDE 10 MG/1
10 TABLET ORAL DAILY
Qty: 30 TABLET | Refills: 0 | Status: SHIPPED | OUTPATIENT
Start: 2020-11-30 | End: 2020-12-23

## 2020-12-01 ENCOUNTER — TELEMEDICINE (OUTPATIENT)
Dept: BARIATRICS/WEIGHT MGMT | Age: 25
End: 2020-12-01
Payer: COMMERCIAL

## 2020-12-01 ENCOUNTER — TELEPHONE (OUTPATIENT)
Dept: BARIATRICS/WEIGHT MGMT | Age: 25
End: 2020-12-01

## 2020-12-01 PROCEDURE — 99214 OFFICE O/P EST MOD 30 MIN: CPT | Performed by: FAMILY MEDICINE

## 2020-12-01 ASSESSMENT — ENCOUNTER SYMPTOMS
ABDOMINAL PAIN: 0
SHORTNESS OF BREATH: 0
ABDOMINAL DISTENTION: 0
CONSTIPATION: 0
EYE PAIN: 0
BLOOD IN STOOL: 0
CHEST TIGHTNESS: 0
DIARRHEA: 0
CHOKING: 0
PHOTOPHOBIA: 0
VOMITING: 0
WHEEZING: 0
NAUSEA: 0
COUGH: 0
APNEA: 0

## 2020-12-07 RX ORDER — AZITHROMYCIN 250 MG/1
250 TABLET, FILM COATED ORAL SEE ADMIN INSTRUCTIONS
Qty: 6 TABLET | Refills: 0 | Status: SHIPPED | OUTPATIENT
Start: 2020-12-07 | End: 2020-12-12

## 2020-12-14 RX ORDER — FENOFIBRATE 48 MG/1
TABLET, COATED ORAL
Qty: 60 TABLET | Refills: 2 | Status: SHIPPED | OUTPATIENT
Start: 2020-12-14 | End: 2021-03-09

## 2020-12-15 ENCOUNTER — TELEPHONE (OUTPATIENT)
Dept: BARIATRICS/WEIGHT MGMT | Age: 25
End: 2020-12-15

## 2020-12-15 ENCOUNTER — TELEMEDICINE (OUTPATIENT)
Dept: BARIATRICS/WEIGHT MGMT | Age: 25
End: 2020-12-15
Payer: COMMERCIAL

## 2020-12-15 PROCEDURE — 99213 OFFICE O/P EST LOW 20 MIN: CPT | Performed by: FAMILY MEDICINE

## 2020-12-15 ASSESSMENT — ENCOUNTER SYMPTOMS
ABDOMINAL PAIN: 0
COUGH: 0
VOMITING: 0
CHOKING: 0
NAUSEA: 0
SHORTNESS OF BREATH: 0
BLOOD IN STOOL: 0
APNEA: 0
EYE PAIN: 0
DIARRHEA: 0
PHOTOPHOBIA: 0
CHEST TIGHTNESS: 0
ABDOMINAL DISTENTION: 0
CONSTIPATION: 0
WHEEZING: 0

## 2020-12-15 NOTE — PROGRESS NOTES
Patient: Sakshi Dow                      Encounter Date: 12/15/2020    YOB: 1995                Age: 30 y.o. Chief Complaint   Patient presents with    Weight Management     F/u Qsymia         Patient identification was verified at the start of the visit. Patient-Reported Vitals 11/17/2020   Patient-Reported Weight 240   Patient-Reported Height 5'7   Patient-Reported Temperature -         BP Readings from Last 1 Encounters:   11/19/20 125/84       BMI Readings from Last 1 Encounters:   11/19/20 37.50 kg/m²       Pulse Readings from Last 1 Encounters:   11/19/20 80       Wt Readings from Last 3 Encounters:   11/19/20 239 lb 6.4 oz (108.6 kg)   10/15/20 246 lb (111.6 kg)   03/23/20 241 lb (109.3 kg)       HPI: 30 y.o. female with a long-standing history of obesity presents today for virtual video follow-up. Her weight is stable since her last visit on 12/1. Current treatment includes low carb/jasmyn diet. Did not start Qsymia due to cost. Hasn't started exercise program at [facility] (was on a 10-day quarantine). Food recall reviewed with the patient. Making good dietary choices. Started using food delivery system ([employer]) which has made meal planning/prepping easier. Wants to focus on lifestyle modification only.           No Known Allergies      Current Outpatient Medications:     fenofibrate (TRICOR) 48 MG tablet, Take 1 tablet by mouth daily for 2 weeks and then increase to 2 tablets daily, Disp: 60 tablet, Rfl: 2    cetirizine (ZYRTEC) 10 MG tablet, Take 1 tablet by mouth daily, Disp: 30 tablet, Rfl: 0    Omega-3 Fatty Acids (FISH OIL) 1000 MG CAPS, Take 1 capsule by mouth 3 times daily, Disp: 90 capsule, Rfl: 3    fluticasone (FLONASE) 50 MCG/ACT nasal spray, 2 sprays by Each Nostril route daily, Disp: 3 Bottle, Rfl: 1    vitamin D (ERGOCALCIFEROL) 1.25 MG (03601 UT) CAPS capsule, Take 1 capsule by mouth once a week for 8 doses, Disp: 8 capsule, Rfl: 0  Chloride 11/16/2020 101  99 - 110 mmol/L Final    CO2 11/16/2020 24  21 - 32 mmol/L Final    Anion Gap 11/16/2020 13  3 - 16 Final    Glucose 11/16/2020 87  70 - 99 mg/dL Final    BUN 11/16/2020 12  7 - 20 mg/dL Final    CREATININE 11/16/2020 0.7  0.6 - 1.1 mg/dL Final    GFR Non- 11/16/2020 >60  >60 Final    Comment: >60 mL/min/1.73m2 EGFR, calc. for ages 25 and older using the  MDRD formula (not corrected for weight), is valid for stable  renal function.  GFR  11/16/2020 >60  >60 Final    Comment: Chronic Kidney Disease: less than 60 ml/min/1.73 sq.m. Kidney Failure: less than 15 ml/min/1.73 sq.m. Results valid for patients 18 years and older.  Calcium 11/16/2020 10.0  8.3 - 10.6 mg/dL Final    Total Protein 11/16/2020 7.2  6.4 - 8.2 g/dL Final    Alb 11/16/2020 4.5  3.4 - 5.0 g/dL Final    Albumin/Globulin Ratio 11/16/2020 1.7  1.1 - 2.2 Final    Total Bilirubin 11/16/2020 0.6  0.0 - 1.0 mg/dL Final    Alkaline Phosphatase 11/16/2020 58  40 - 129 U/L Final    ALT 11/16/2020 40  10 - 40 U/L Final    AST 11/16/2020 33  15 - 37 U/L Final    Globulin 11/16/2020 2.7  g/dL Final    Vit D, 25-Hydroxy 11/16/2020 15.5* >=30 ng/mL Final    Comment: <=20 ng/mL. ........... [place] Deficient  21-29 ng/mL. ......... [place] Insufficient  >=30 ng/mL. ........ [place] Sufficient      Vitamin B-12 11/16/2020 596  211 - 911 pg/mL Final    Folate 11/16/2020 5.27  4.78 - 24.20 ng/mL Final    Comment: Effective 11-15-16 10:00am EST  Please note reference ranges have  changed for Folate.  TSH 11/16/2020 3.31  0.27 - 4.20 uIU/mL Final    Cholesterol, Total 11/16/2020 149  0 - 199 mg/dL Final    Triglycerides 11/16/2020 481* 0 - 150 mg/dL Final    HDL 11/16/2020 40  40 - 60 mg/dL Final    LDL Calculated 11/16/2020 see below  <100 mg/dL Final    Comment: When the triglyceride is <30 mg/dL or >300 mg/dL the  calculated LDL and  VLDL are not valid and a measured  LDL is performed.  VLDL Cholesterol Calculated 11/16/2020 see below  Not Established mg/dL Final    Comment: When the triglyceride is <30 mg/dL or >300 mg/dL the  calculated LDL and  VLDL are not valid and a measured  LDL is performed.  Hemoglobin A1C 11/16/2020 4.9  See comment % Final    Comment: Comment:  Diagnosis of Diabetes: > or = 6.5%  Increased risk of diabetes (Prediabetes): 5.7-6.4%  Glycemic Control: Nonpregnant Adults: <7.0%                    Pregnant: <6.0%        eAG 11/16/2020 93.9  mg/dL Final    Ventricular Rate 11/16/2020 76  BPM Final    Atrial Rate 11/16/2020 76  BPM Final    P-R Interval 11/16/2020 148  ms Final    QRS Duration 11/16/2020 84  ms Final    Q-T Interval 11/16/2020 376  ms Final    QTc Calculation (Bazett) 11/16/2020 423  ms Final    P Axis 11/16/2020 38  degrees Final    R Axis 11/16/2020 -8  degrees Final    T Axis 11/16/2020 19  degrees Final    Diagnosis 11/16/2020 Normal sinus rhythmNormal ECGNo previous ECGs availableConfirmed by LORI GLYNN, Gorge Olson ([id number]) on 11/16/2020 4:34:49 PM   Final    LDL Direct 11/16/2020 63  <100 mg/dL Final         Assessment and Plan:  1. Class 2 obesity  Stable since last visit. Reinforced 1200-Christiano/low carb meal plan. Nectar protein shakes for 1-2 meal replacements/day. Start exercise program at [facility]. Keep food journal.  F/u in 4 weeks. 2. Dietary counseling and surveillance  1200-Christiano/low carb meal plan.           Nutrition Plan: [] LCHF/Ketogenic   [x] Modified low-calorie diet (low carb/low-christiano)               [] Low-calorie diet    [] Maintenance       []Other        Exercise: [x] Cardio     [x] Resistance/strength training                       [x] ACSM recommendations (150 minutes/week in active weight loss)               Behavior: [x] Motivational interviewing performed    [] Referralfor counseling                         [x] Discussed strategies to overcome habits/challenges for focus [] Stress management   [x] Stimulus control         [] Sleep hygiene      Reviewed:  [x] Nutrition and the importance of regular protein intake  [x] Hidden carbohydrate sources  [x] Alcohol use  [x] Tobacco use   [x] Drug use- Denies  [x] Importance of exercise and reducingsedentary time  [x] Treatment consent- Patient understands and agrees with the treatment plan   [x] Proper use of medication and side effects  [x] OARRS report             Key dietary points:    - Meats (preferably organic or grass fed) are great sources of protein and have no carbohydrates. - Recommend coconut, olive, avocado, or almond oils. - When buying dairy, choose regular or full fat options. - Choose vegetables that grow above ground as they are generally lower in carbohydrates and higher in fiber.  - Avoid starches such as bread, rice, potatoes, pasta and all sources of simple sugars (desserts, soda, breakfast cereals). - Choose beverages that are calorie and sugar free. No orders of the defined types were placed in this encounter. No follow-ups on file.

## 2020-12-15 NOTE — TELEPHONE ENCOUNTER
Called and lvm for pt to call office to set up her next appt with Dr Caitie Madrigal, 4wks. Also need to send pt  release form.   Thank you

## 2020-12-23 RX ORDER — CETIRIZINE HYDROCHLORIDE 10 MG/1
TABLET ORAL
Qty: 30 TABLET | Refills: 2 | Status: SHIPPED | OUTPATIENT
Start: 2020-12-23 | End: 2021-04-05

## 2020-12-23 NOTE — TELEPHONE ENCOUNTER
Last office visit :11/19/2020    Future Appointments   Date Time Provider Joao Marquez   2/23/2021 10:00 AM Carlos Díaz MD [facility]

## 2021-01-05 ENCOUNTER — VIRTUAL VISIT (OUTPATIENT)
Dept: INTERNAL MEDICINE CLINIC | Age: 26
End: 2021-01-05
Payer: COMMERCIAL

## 2021-01-05 ENCOUNTER — HOSPITAL ENCOUNTER (OUTPATIENT)
Dept: GENERAL RADIOLOGY | Age: 26
Discharge: HOME | End: 2021-01-05
Payer: COMMERCIAL

## 2021-01-05 ENCOUNTER — HOSPITAL ENCOUNTER (OUTPATIENT)
Age: 26
Discharge: HOME | End: 2021-01-05
Payer: COMMERCIAL

## 2021-01-05 DIAGNOSIS — J04.0 ACUTE LARYNGITIS: ICD-10-CM

## 2021-01-05 DIAGNOSIS — R05.9 COUGH IN ADULT: Primary | ICD-10-CM

## 2021-01-05 DIAGNOSIS — R05.9 COUGH IN ADULT: ICD-10-CM

## 2021-01-05 PROCEDURE — G8484 FLU IMMUNIZE NO ADMIN: HCPCS | Performed by: NURSE PRACTITIONER

## 2021-01-05 PROCEDURE — 4004F PT TOBACCO SCREEN RCVD TLK: CPT | Performed by: NURSE PRACTITIONER

## 2021-01-05 PROCEDURE — G8417 CALC BMI ABV UP PARAM F/U: HCPCS | Performed by: NURSE PRACTITIONER

## 2021-01-05 PROCEDURE — 99213 OFFICE O/P EST LOW 20 MIN: CPT | Performed by: NURSE PRACTITIONER

## 2021-01-05 PROCEDURE — G8427 DOCREV CUR MEDS BY ELIG CLIN: HCPCS | Performed by: NURSE PRACTITIONER

## 2021-01-05 PROCEDURE — 71046 X-RAY EXAM CHEST 2 VIEWS: CPT

## 2021-01-05 RX ORDER — PREDNISONE 20 MG/1
40 TABLET ORAL DAILY
Qty: 10 TABLET | Refills: 0 | Status: SHIPPED | OUTPATIENT
Start: 2021-01-05 | End: 2021-01-10

## 2021-01-05 RX ORDER — ALBUTEROL SULFATE 90 UG/1
AEROSOL, METERED RESPIRATORY (INHALATION)
COMMUNITY
Start: 2020-12-09 | End: 2021-06-09 | Stop reason: ALTCHOICE

## 2021-01-05 ASSESSMENT — ENCOUNTER SYMPTOMS
WHEEZING: 1
CHEST TIGHTNESS: 0
VOMITING: 0
SINUS PRESSURE: 0
COUGH: 1
ABDOMINAL PAIN: 0
SORE THROAT: 0
VOICE CHANGE: 1
SINUS PAIN: 0
EYE DISCHARGE: 0
NAUSEA: 0
SHORTNESS OF BREATH: 0

## 2021-01-05 ASSESSMENT — PATIENT HEALTH QUESTIONNAIRE - PHQ9
1. LITTLE INTEREST OR PLEASURE IN DOING THINGS: 0
SUM OF ALL RESPONSES TO PHQ QUESTIONS 1-9: 0
2. FEELING DOWN, DEPRESSED OR HOPELESS: 0
SUM OF ALL RESPONSES TO PHQ9 QUESTIONS 1 & 2: 0

## 2021-01-05 NOTE — PROGRESS NOTES
2021    TELEHEALTH EVALUATION -- Audio/Visual (During TLGCY-34 public health emergency)    HPI:  30year old CF with past medial history of obesity , hypertriglyceridemia, allergies seasonal; . Pancreatitis asking for a VV for return of a cough and wheezing that is worse at night. She also has some laryngitis due to cough and stuffy nose . Productive cough at night of clear sputum. She denies any sore throat, fevers, SOB, chest pain , body aches, chills, N/V , Rash. She states that she was seen in a UC in December for  the same symptoms and was given albuterol inhaler that she uses at night 2 puffs. She was also tested for COVID and was negative. She f/u with Dr Robin Gamble and was given a zpac for congestion and cough . She states that her symptoms improved with a zpac but in the last week they have returned. She is tolerating fluids and eating. Jacquie Flood (:  1995) has requested an audio/video evaluation for the following concern(s): cough and wheezing       Review of Systems   Constitutional: Negative for chills, fatigue and fever. HENT: Positive for congestion, postnasal drip and voice change. Negative for ear discharge, ear pain, sinus pressure, sinus pain and sore throat. Eyes: Negative for discharge. Respiratory: Positive for cough and wheezing. Negative for chest tightness and shortness of breath. Cardiovascular: Negative for chest pain and palpitations. Gastrointestinal: Negative for abdominal pain, nausea and vomiting. Musculoskeletal: Negative for myalgias. Skin: Negative for rash. Neurological: Negative for syncope and headaches. Prior to Visit Medications    Medication Sig Taking?  Authorizing Provider   albuterol sulfate  (90 Base) MCG/ACT inhaler TAKE 2 PUFFS EVERY 4 6 HOURS AS NEEDED FOR COUGH Yes Historical Provider, MD   predniSONE (DELTASONE) 20 MG tablet Take 2 tablets by mouth daily for 5 days Yes SWAPNA Flowers - CNP cetirizine (ZYRTEC) 10 MG tablet TAKE 1 TABLET BY MOUTH EVERY DAY Yes Aly Bates MD   fenofibrate (TRICOR) 48 MG tablet Take 1 tablet by mouth daily for 2 weeks and then increase to 2 tablets daily Yes Aly Bates MD   Omega-3 Fatty Acids (FISH OIL) 1000 MG CAPS Take 1 capsule by mouth 3 times daily Yes Aly Bates MD   vitamin D (ERGOCALCIFEROL) 1.25 MG (21541 UT) CAPS capsule Take 1 capsule by mouth once a week for 8 doses Yes Vinny Levy MD   fluticasone (FLONASE) 50 MCG/ACT nasal spray 2 sprays by Each Nostril route daily  Patient not taking: Reported on 1/5/2021  Aly Bates MD       Social History     Tobacco Use    Smoking status: Current Every Day Smoker     Types: Cigarettes    Smokeless tobacco: Never Used   Substance Use Topics    Alcohol use: Yes    Drug use: Yes     Types: Marijuana        No Known Allergies    PHYSICAL EXAMINATION:  [ INSTRUCTIONS:  \"[x]\" Indicates a positive item  \"[]\" Indicates a negative item  -- DELETE ALL ITEMS NOT EXAMINED]      Constitutional: [x] Appears well-developed and well-nourished [x] No apparent distress      [] Abnormal-   Mental status  [x] Alert and awake  [x] Oriented to person/place/time [x]Able to follow commands      Eyes:  EOM    [x]  Normal  [] Abnormal-  Sclera  [x]  Normal  [] Abnormal -         Discharge [x]  None visible  [] Abnormal -    HENT:   [x] Normocephalic, atraumatic.   [] Abnormal   [x] Mouth/Throat: Mucous membranes are moist.     External Ears [x] Normal  [] Abnormal-     Neck: [x] No visualized mass     Pulmonary/Chest: [x] Respiratory effort normal.  [x] No visualized signs of difficulty breathing or respiratory distress        [] Abnormal-      Musculoskeletal:   [] Normal gait with no signs of ataxia         [x] Normal range of motion of neck        [] Abnormal-       Neurological:        [x] No Facial Asymmetry (Cranial nerve 7 motor function) (limited exam to video visit)          [] No gaze palsy [] Abnormal-         Skin:        [x] No significant exanthematous lesions or discoloration noted on facial skin         [] Abnormal-            Psychiatric:       [x] Normal Affect [x] No Hallucinations        [] Abnormal-     Other pertinent observable physical exam findings-   Color in pink. Speaks in full sentences without any respiratory distress. + hoarse voice. ASSESSMENT/PLAN:  1. Cough in adult, acute   Instructed to obtain CXR today   Increase albuterol inhaler to 4 puffs every 4-6 hours , nargis at bedtime. elevated HOB at night   Increase fluid intake  Use OTC cough medications   Start prednisone as directed. - XR CHEST (2 VW); Future  - predniSONE (DELTASONE) 20 MG tablet; Take 2 tablets by mouth daily for 5 days  Dispense: 10 tablet; Refill: 0    2. Acute laryngitis  Gargle saltwater 3-4 x a day   Increase warm liquid with honey    If you develop worsening or concerning symptoms ( fevers, SOB, difficulty breathing , chest pain , etc)   go to the ER ASAP    Return for follow up in 3 days if not improving , sooner if symptoms worsen.  .. Tammy Lea is a 30 y.o. female being evaluated by a Virtual Visit (video visit) encounter to address concerns as mentioned above. A caregiver was present when appropriate. Due to this being a TeleHealth encounter (During DDZRP-21 public health emergency), evaluation of the following organ systems was limited: Vitals/Constitutional/EENT/Resp/CV/GI//MS/Neuro/Skin/Heme-Lymph-Imm. Pursuant to the emergency declaration under the [address] [place] [address] and the [employer] Act, this Virtual Visit was conducted with patient's (and/or legal guardian's) consent, to reduce the patient's risk of exposure to COVID-19 and provide necessary medical care. The patient (and/or legal guardian) has also been advised to contact this office for worsening conditions or problems, and seek emergency medical treatment and/or call 911 if deemed necessary. Patient identification was verified at the start of the visit: Yes    Total time spent on this encounter: Not billed by time    Services were provided through a video synchronous discussion virtually to substitute for in-person clinic visit. Patient and provider were located at their individual homes. --SWAPNA Villarreal - CNP on 1/5/2021 at 11:11 AM    An electronic signature was used to authenticate this note.

## 2021-01-28 RX ORDER — ERGOCALCIFEROL 1.25 MG/1
50000 CAPSULE ORAL WEEKLY
Qty: 8 CAPSULE | Refills: 1 | Status: SHIPPED | OUTPATIENT
Start: 2021-01-28 | End: 2021-10-19

## 2021-01-28 NOTE — TELEPHONE ENCOUNTER
Vit d last filled 11/17/20                                                Last ov 1/5/21                                                                  Next ov 3/15/21

## 2021-01-28 NOTE — TELEPHONE ENCOUNTER
Patient calling asking for a refill on vitamin D (ERGOCALCIFEROL) 1.25 MG (37471 UT) CAPS capsule. Patient states her previously Brittany Dubon doctor had written this prescription. Patient still uses [facility]/pharm[facility] #[id number]- [place], [address].  - P [phone]     Please Advise

## 2021-02-03 NOTE — PROGRESS NOTES
Patient: Gabriel Armenta                      Encounter Date: 12/1/2020    YOB: 1995                Age: 30 y.o. Chief Complaint   Patient presents with    Weight Management     MW          Patient identification was verified at the start of the visit. Patient-Reported Vitals 11/17/2020   Patient-Reported Weight 240   Patient-Reported Height 5'7   Patient-Reported Temperature -         BP Readings from Last 1 Encounters:   11/19/20 125/84       BMI Readings from Last 1 Encounters:   11/19/20 37.50 kg/m²       Pulse Readings from Last 1 Encounters:   11/19/20 80           HPI: 30 y.o. female with a long-standing history of obesity presents today for virtual video follow-up. she has lost 7 pounds since her last visit. Current treatment includes 1200-Christiano/low carb diet and exercise. Tolerating it well. Food recall reviewed with the patient. Making better dietary choices. Trying to cut back on starches. Walking on treadmill at home. Motivated to continue losing weight. Interested in aom to help with appetite regulation.          No Known Allergies      Current Outpatient Medications:     cetirizine (ZYRTEC) 10 MG tablet, Take 1 tablet by mouth daily, Disp: 30 tablet, Rfl: 0    fenofibrate (TRICOR) 48 MG tablet, Take 1 tablet by mouth daily for 2 weeks and then increase to 2 tablets daily, Disp: 60 tablet, Rfl: 2    Omega-3 Fatty Acids (FISH OIL) 1000 MG CAPS, Take 1 capsule by mouth 3 times daily, Disp: 90 capsule, Rfl: 3    fluticasone (FLONASE) 50 MCG/ACT nasal spray, 2 sprays by Each Nostril route daily, Disp: 3 Bottle, Rfl: 1    vitamin D (ERGOCALCIFEROL) 1.25 MG (43507 UT) CAPS capsule, Take 1 capsule by mouth once a week for 8 doses, Disp: 8 capsule, Rfl: 0    Patient Active Problem List   Diagnosis    Obesity (BMI 35.0-39.9 without comorbidity)    Migraine    Hypertriglyceridemia    Seasonal allergic rhinitis due to pollen    Congenital anomaly of ureter    Difficulty concentrating  Easy bruising    Insomnia    Myopia    Pancreatitis    Regular astigmatism       Review of Systems   Constitutional: Negative for fatigue. Eyes: Negative for photophobia, pain and visual disturbance. Respiratory: Negative for apnea, cough, choking, chest tightness, shortness of breath and wheezing. Cardiovascular: Negative for chest pain, palpitations and leg swelling. Gastrointestinal: Negative for abdominal distention, abdominal pain, blood in stool, constipation, diarrhea, nausea and vomiting. Endocrine: Negative for cold intolerance and heat intolerance. Musculoskeletal: Negative for arthralgias and myalgias. Skin: Negative for rash. Neurological: Negative for dizziness, tremors, syncope, weakness, numbness and headaches. Psychiatric/Behavioral: Negative for agitation, confusion, decreased concentration, dysphoric mood, hallucinations, sleep disturbance and suicidal ideas. The patient is not nervous/anxious and is not hyperactive. Physical Exam  Constitutional:       Appearance: She is well-developed. HENT:      Head: Normocephalic. Eyes:      Conjunctiva/sclera: Conjunctivae normal.   Abdominal:      General: Abdomen is protuberant. Musculoskeletal:         General: No swelling. Neurological:      Mental Status: She is alert and oriented to person, place, and time. Psychiatric:         Mood and Affect: Mood normal.         Behavior: Behavior normal.         Thought Content:  Thought content normal.         Judgment: Judgment normal.         Hospital Outpatient Visit on 11/16/2020   Component Date Value Ref Range Status    Sodium 11/16/2020 138  136 - 145 mmol/L Final    Potassium 11/16/2020 3.9  3.5 - 5.1 mmol/L Final    Chloride 11/16/2020 101  99 - 110 mmol/L Final    CO2 11/16/2020 24  21 - 32 mmol/L Final    Anion Gap 11/16/2020 13  3 - 16 Final    Glucose 11/16/2020 87  70 - 99 mg/dL Final    BUN 11/16/2020 12  7 - 20 mg/dL Final    CREATININE 11/16/2020 0.7  0.6 - 1.1 mg/dL Final    GFR Non- 11/16/2020 >60  >60 Final    Comment: >60 mL/min/1.73m2 EGFR, calc. for ages 25 and older using the  MDRD formula (not corrected for weight), is valid for stable  renal function.  GFR  11/16/2020 >60  >60 Final    Comment: Chronic Kidney Disease: less than 60 ml/min/1.73 sq.m. Kidney Failure: less than 15 ml/min/1.73 sq.m. Results valid for patients 18 years and older.  Calcium 11/16/2020 10.0  8.3 - 10.6 mg/dL Final    Total Protein 11/16/2020 7.2  6.4 - 8.2 g/dL Final    Alb 11/16/2020 4.5  3.4 - 5.0 g/dL Final    Albumin/Globulin Ratio 11/16/2020 1.7  1.1 - 2.2 Final    Total Bilirubin 11/16/2020 0.6  0.0 - 1.0 mg/dL Final    Alkaline Phosphatase 11/16/2020 58  40 - 129 U/L Final    ALT 11/16/2020 40  10 - 40 U/L Final    AST 11/16/2020 33  15 - 37 U/L Final    Globulin 11/16/2020 2.7  g/dL Final    Vit D, 25-Hydroxy 11/16/2020 15.5* >=30 ng/mL Final    Comment: <=20 ng/mL. ........... [place] Bound Deficient  21-29 ng/mL. ......... [place] Bound Insufficient  >=30 ng/mL. ........ [place] Bound Sufficient      Vitamin B-12 11/16/2020 596  211 - 911 pg/mL Final    Folate 11/16/2020 5.27  4.78 - 24.20 ng/mL Final    Comment: Effective 11-15-16 10:00am EST  Please note reference ranges have  changed for Folate.  TSH 11/16/2020 3.31  0.27 - 4.20 uIU/mL Final    Cholesterol, Total 11/16/2020 149  0 - 199 mg/dL Final    Triglycerides 11/16/2020 481* 0 - 150 mg/dL Final    HDL 11/16/2020 40  40 - 60 mg/dL Final    LDL Calculated 11/16/2020 see below  <100 mg/dL Final    Comment: When the triglyceride is <30 mg/dL or >300 mg/dL the  calculated LDL and  VLDL are not valid and a measured  LDL is performed.  VLDL Cholesterol Calculated 11/16/2020 see below  Not Established mg/dL Final    Comment: When the triglyceride is <30 mg/dL or >300 mg/dL the  calculated LDL and  VLDL are not valid and a measured  LDL is performed.       Hemoglobin A1C 11/16/2020 Instructions: This plan will send the code FBSE to the PM system.  DO NOT or CHANGE the price. Detail Level: Simple hypersensitivity to the prescribing meds, history of pancreatitis, or personal or family history of thyroid medullary cancer. Treatment start date: 12/2/20  12 weeks: 3/2/20  Starting weight: 232 pounds   Goal: At least 5-10% (11.5-23 pounds)    Key dietary points:    - Meats (preferably organic or grass fed) are great sources of protein and have no carbohydrates. - Recommend coconut, olive, avocado, or almond oils. - When buying dairy, choose regular or full fat options. - Choose vegetables that grow above ground as they are generally lower in carbohydrates and higher in fiber.  - Avoid starches such as bread, rice, potatoes, pasta and all sources of simple sugars (desserts, soda, breakfast cereals). - Choose beverages that are calorie and sugar free. Reminder regarding weight loss medications: You must be seen in office every 2-4 weeks to haveyour prescriptions refilled. If you are off of your medication for longer than 7 days, you will not be able to restart the medication for at least 6 months. Always call our office to report any side effects. Females, it is your responsibility to obtain negative pregnancy tests each month. No orders of the defined types were placed in this encounter. No follow-ups on file. Johanna Porras is a 30 y.o. female being evaluated by a Virtual Visit (video visit) encounter to address concerns as mentioned above. A caregiver was present when appropriate. Due to this being a TeleHealth encounter (During AUYUS-07 public health emergency), evaluation of the following organ systems was limited: Vitals/Constitutional/EENT/Resp/CV/GI//MS/Neuro/Skin/Heme-Lymph-Imm.   Pursuant to the emergency declaration under the [address] [address] waiver authority and the [employer] General Act, this Virtual Visit was conducted with patient's (and/or legal guardian's) consent, to reduce the Price (Do Not Change): 0.00 patient's risk of exposure to COVID-19 and provide necessary medical care. The patient (and/or legal guardian) has also been advised to contact this office for worsening conditions or problems, and seek emergency medical treatment and/or call 911 if deemed necessary.

## 2021-03-09 RX ORDER — FENOFIBRATE 48 MG/1
TABLET, COATED ORAL
Qty: 180 TABLET | Refills: 0 | Status: SHIPPED | OUTPATIENT
Start: 2021-03-09 | End: 2021-10-19

## 2021-03-09 NOTE — TELEPHONE ENCOUNTER
Last office visit :01/05/2021    Future Appointments   Date Time Provider Joao Marquez   3/15/2021 11:00 AM Marie Galvan MD [facility]

## 2021-04-05 RX ORDER — CETIRIZINE HYDROCHLORIDE 10 MG/1
TABLET ORAL
Qty: 30 TABLET | Refills: 2 | Status: SHIPPED | OUTPATIENT
Start: 2021-04-05

## 2021-06-09 ENCOUNTER — VIRTUAL VISIT (OUTPATIENT)
Dept: INTERNAL MEDICINE CLINIC | Age: 26
End: 2021-06-09
Payer: COMMERCIAL

## 2021-06-09 DIAGNOSIS — R09.81 COMPLAINT OF NASAL CONGESTION: ICD-10-CM

## 2021-06-09 DIAGNOSIS — H92.02 EAR PAIN, LEFT: Primary | ICD-10-CM

## 2021-06-09 PROCEDURE — 4004F PT TOBACCO SCREEN RCVD TLK: CPT | Performed by: NURSE PRACTITIONER

## 2021-06-09 PROCEDURE — G8417 CALC BMI ABV UP PARAM F/U: HCPCS | Performed by: NURSE PRACTITIONER

## 2021-06-09 PROCEDURE — 99213 OFFICE O/P EST LOW 20 MIN: CPT | Performed by: NURSE PRACTITIONER

## 2021-06-09 PROCEDURE — G8427 DOCREV CUR MEDS BY ELIG CLIN: HCPCS | Performed by: NURSE PRACTITIONER

## 2021-06-09 RX ORDER — AMOXICILLIN AND CLAVULANATE POTASSIUM 875; 125 MG/1; MG/1
1 TABLET, FILM COATED ORAL 2 TIMES DAILY
Qty: 20 TABLET | Refills: 0 | Status: SHIPPED | OUTPATIENT
Start: 2021-06-09 | End: 2021-06-19

## 2021-06-09 SDOH — ECONOMIC STABILITY: FOOD INSECURITY: WITHIN THE PAST 12 MONTHS, THE FOOD YOU BOUGHT JUST DIDN'T LAST AND YOU DIDN'T HAVE MONEY TO GET MORE.: NEVER TRUE

## 2021-06-09 SDOH — ECONOMIC STABILITY: FOOD INSECURITY: WITHIN THE PAST 12 MONTHS, YOU WORRIED THAT YOUR FOOD WOULD RUN OUT BEFORE YOU GOT MONEY TO BUY MORE.: NEVER TRUE

## 2021-06-09 ASSESSMENT — ENCOUNTER SYMPTOMS
ABDOMINAL PAIN: 0
SINUS PRESSURE: 0
FACIAL SWELLING: 0
RHINORRHEA: 1
COUGH: 0
CHEST TIGHTNESS: 0
SHORTNESS OF BREATH: 0
SINUS PAIN: 0
WHEEZING: 0
SORE THROAT: 1
TROUBLE SWALLOWING: 0

## 2021-06-09 ASSESSMENT — PATIENT HEALTH QUESTIONNAIRE - PHQ9
1. LITTLE INTEREST OR PLEASURE IN DOING THINGS: 0
SUM OF ALL RESPONSES TO PHQ QUESTIONS 1-9: 0
SUM OF ALL RESPONSES TO PHQ9 QUESTIONS 1 & 2: 0
2. FEELING DOWN, DEPRESSED OR HOPELESS: 0

## 2021-06-09 ASSESSMENT — SOCIAL DETERMINANTS OF HEALTH (SDOH): HOW HARD IS IT FOR YOU TO PAY FOR THE VERY BASICS LIKE FOOD, HOUSING, MEDICAL CARE, AND HEATING?: NOT HARD AT ALL

## 2021-06-09 NOTE — PROGRESS NOTES
2021    TELEHEALTH EVALUATION -- Audio/Visual (During GFDNS-26 public health emergency)    HPI:    Flako Valdez (:  1995) has requested an audio/video evaluation for the following concern(s): Ear pain to left , nasal congestion , sore throat x 4 days     Patient states that she has been having some nasal congestion, PND and scratchy/sore throat . Left ear pain and popping sounds. She denies any fevers , chills, cough , wheezing, body aches, sinus pain or pressure, no facial swelling. She feels that L ear pain has worsen today. No drainage, redness , or hearing loss. She has not been using her Flonase , but has been taking zyrtec. She is tolerating fluids. Review of Systems   Constitutional: Negative for chills, diaphoresis, fatigue and fever. HENT: Positive for congestion, ear pain, postnasal drip, rhinorrhea and sore throat. Negative for ear discharge, facial swelling, hearing loss, sinus pressure, sinus pain, tinnitus and trouble swallowing. Eyes: Negative for visual disturbance. Respiratory: Negative for cough, chest tightness, shortness of breath and wheezing. Cardiovascular: Negative for chest pain, palpitations and leg swelling. Gastrointestinal: Negative for abdominal pain. Musculoskeletal: Negative for myalgias. Neurological: Negative for dizziness and headaches. Prior to Visit Medications    Medication Sig Taking?  Authorizing Provider   amoxicillin-clavulanate (AUGMENTIN) 875-125 MG per tablet Take 1 tablet by mouth 2 times daily for 10 days Yes SWAPNA Garcia - CNP   cetirizine (ZYRTEC) 10 MG tablet TAKE 1 TABLET BY MOUTH EVERY DAY Yes Arturo Maldonado MD   fenofibrate (TRICOR) 48 MG tablet TAKE 1 TABLET BY MOUTH DAILY FOR 2 WEEKS AND THEN INCREASE TO 2 TABLETS DAILY Yes SWAPNA Garcia - CNP   Omega-3 Fatty Acids (FISH OIL) 1000 MG CAPS Take 1 capsule by mouth 3 times daily Yes Arturo Maldonado MD   vitamin D (ERGOCALCIFEROL) 1.25 MG (56766 UT) CAPS capsule Take 1 capsule by mouth once a week for 8 doses  Andrae Delacruz MD   fluticasone (FLONASE) 50 MCG/ACT nasal spray 2 sprays by Each Nostril route daily  Patient not taking: Reported on 1/5/2021  Andrae Delacruz MD       Social History     Tobacco Use    Smoking status: Current Every Day Smoker     Types: Cigarettes    Smokeless tobacco: Never Used   Vaping Use    Vaping Use: Never used   Substance Use Topics    Alcohol use: Yes    Drug use: Yes     Types: Marijuana        No Known Allergies    PHYSICAL EXAMINATION:  [ INSTRUCTIONS:  \"[x]\" Indicates a positive item  \"[]\" Indicates a negative item  -- DELETE ALL ITEMS NOT EXAMINED]      Constitutional: [x] Appears well-developed and well-nourished [x] No apparent distress        Mental status  [x] Alert and awake  [x] Oriented to person/place/time [x]Able to follow commands      Eyes:  EOM    [x]  Normal    Sclera  [x]  Normal           Discharge [x]  None visible      HENT:   [x] Normocephalic, atraumatic. [x] Mouth/Throat: Mucous membranes are moist.     External Ears [x] Normal      Neck: [x] No visualized mass     Pulmonary/Chest: [x] Respiratory effort normal.  [x] No visualized signs of difficulty breathing or respiratory distress          Musculoskeletal:           [x] Normal range of motion of neck           Neurological:        [x] No Facial Asymmetry (Cranial nerve 7 motor function) (limited exam to video visit)          [x] No gaze palsy             Skin:        [x] No significant exanthematous lesions or discoloration noted on facial skin               Psychiatric:       [x] Normal Affect [x] No Hallucinations        Other pertinent observable physical exam findings- color pink , no resp distress noted     ASSESSMENT/PLAN:  1. Ear pain, left  Acute   Tylenol or ibuprofen for pain   Start abx as prescribed   - amoxicillin-clavulanate (AUGMENTIN) 875-125 MG per tablet;  Take 1 tablet by mouth 2 times daily for 10 days  Dispense: 20 tablet; Refill: 0    2. Complaint of nasal congestion, acute   Start Flonase  Cont zyrtec     Monitor for worsening symptoms if noted call the office for an appt. All questions addresses and answered . Patient agrees with plan of care. Return if symptoms worsen or fail to improve. Rambo Gonzales, was evaluated through a synchronous (real-time) audio-video encounter. The patient (or guardian if applicable) is aware that this is a billable service. Verbal consent to proceed has been obtained within the past 12 months. The visit was conducted pursuant to the emergency declaration under the [address] [place] [address], [address] [place] [address] authority and the [email or web address] and [employer] General Act. Patient identification was verified, and a caregiver was present when appropriate. The patient was located in a state where the provider was credentialed to provide care. Total time spent on this encounter: Not billed by time    --SWAPNA Peter CNP on 6/9/2021 at 1:51 PM    An electronic signature was used to authenticate this note.

## 2021-10-19 ENCOUNTER — OFFICE VISIT (OUTPATIENT)
Dept: INTERNAL MEDICINE CLINIC | Age: 26
End: 2021-10-19
Payer: COMMERCIAL

## 2021-10-19 VITALS
BODY MASS INDEX: 37.35 KG/M2 | DIASTOLIC BLOOD PRESSURE: 82 MMHG | OXYGEN SATURATION: 97 % | WEIGHT: 238 LBS | HEIGHT: 67 IN | SYSTOLIC BLOOD PRESSURE: 120 MMHG

## 2021-10-19 DIAGNOSIS — Z00.01 ENCOUNTER FOR WELL ADULT EXAM WITH ABNORMAL FINDINGS: Primary | ICD-10-CM

## 2021-10-19 DIAGNOSIS — E78.1 HYPERTRIGLYCERIDEMIA: ICD-10-CM

## 2021-10-19 DIAGNOSIS — G43.909 MIGRAINE WITHOUT STATUS MIGRAINOSUS, NOT INTRACTABLE, UNSPECIFIED MIGRAINE TYPE: ICD-10-CM

## 2021-10-19 DIAGNOSIS — E55.9 VITAMIN D DEFICIENCY: ICD-10-CM

## 2021-10-19 LAB
A/G RATIO: 1.9 (ref 1.1–2.2)
ALBUMIN SERPL-MCNC: 4.3 G/DL (ref 3.4–5)
ALP BLD-CCNC: 51 U/L (ref 40–129)
ALT SERPL-CCNC: 38 U/L (ref 10–40)
ANION GAP SERPL CALCULATED.3IONS-SCNC: 12 MMOL/L (ref 3–16)
AST SERPL-CCNC: 29 U/L (ref 15–37)
BASOPHILS ABSOLUTE: 0.1 K/UL (ref 0–0.2)
BASOPHILS RELATIVE PERCENT: 1.3 %
BILIRUB SERPL-MCNC: 0.5 MG/DL (ref 0–1)
BILIRUBIN DIRECT: <0.2 MG/DL (ref 0–0.3)
BILIRUBIN, INDIRECT: NORMAL MG/DL (ref 0–1)
BUN BLDV-MCNC: 15 MG/DL (ref 7–20)
CALCIUM SERPL-MCNC: 9.6 MG/DL (ref 8.3–10.6)
CHLORIDE BLD-SCNC: 102 MMOL/L (ref 99–110)
CHOLESTEROL, TOTAL: 149 MG/DL (ref 0–199)
CO2: 24 MMOL/L (ref 21–32)
CREAT SERPL-MCNC: 0.7 MG/DL (ref 0.6–1.1)
EOSINOPHILS ABSOLUTE: 0.3 K/UL (ref 0–0.6)
EOSINOPHILS RELATIVE PERCENT: 4.2 %
GFR AFRICAN AMERICAN: >60
GFR NON-AFRICAN AMERICAN: >60
GLOBULIN: 2.3 G/DL
GLUCOSE BLD-MCNC: 98 MG/DL (ref 70–99)
HCT VFR BLD CALC: 40.8 % (ref 36–48)
HDLC SERPL-MCNC: 56 MG/DL (ref 40–60)
HEMOGLOBIN: 14.1 G/DL (ref 12–16)
HEPATITIS C ANTIBODY INTERPRETATION: NORMAL
LDL CHOLESTEROL CALCULATED: 51 MG/DL
LYMPHOCYTES ABSOLUTE: 2 K/UL (ref 1–5.1)
LYMPHOCYTES RELATIVE PERCENT: 29.6 %
MCH RBC QN AUTO: 33.9 PG (ref 26–34)
MCHC RBC AUTO-ENTMCNC: 34.4 G/DL (ref 31–36)
MCV RBC AUTO: 98.5 FL (ref 80–100)
MONOCYTES ABSOLUTE: 0.4 K/UL (ref 0–1.3)
MONOCYTES RELATIVE PERCENT: 5.8 %
NEUTROPHILS ABSOLUTE: 3.9 K/UL (ref 1.7–7.7)
NEUTROPHILS RELATIVE PERCENT: 59.1 %
PDW BLD-RTO: 13.1 % (ref 12.4–15.4)
PLATELET # BLD: 224 K/UL (ref 135–450)
PMV BLD AUTO: 8.6 FL (ref 5–10.5)
POTASSIUM SERPL-SCNC: 4.8 MMOL/L (ref 3.5–5.1)
RBC # BLD: 4.15 M/UL (ref 4–5.2)
SODIUM BLD-SCNC: 138 MMOL/L (ref 136–145)
TOTAL PROTEIN: 6.6 G/DL (ref 6.4–8.2)
TRIGL SERPL-MCNC: 211 MG/DL (ref 0–150)
TSH REFLEX: 3.6 UIU/ML (ref 0.27–4.2)
VITAMIN D 25-HYDROXY: 32.9 NG/ML
VLDLC SERPL CALC-MCNC: 42 MG/DL
WBC # BLD: 6.6 K/UL (ref 4–11)

## 2021-10-19 PROCEDURE — 36415 COLL VENOUS BLD VENIPUNCTURE: CPT | Performed by: INTERNAL MEDICINE

## 2021-10-19 PROCEDURE — 99395 PREV VISIT EST AGE 18-39: CPT | Performed by: INTERNAL MEDICINE

## 2021-10-19 PROCEDURE — G8427 DOCREV CUR MEDS BY ELIG CLIN: HCPCS | Performed by: INTERNAL MEDICINE

## 2021-10-19 PROCEDURE — G8484 FLU IMMUNIZE NO ADMIN: HCPCS | Performed by: INTERNAL MEDICINE

## 2021-10-19 PROCEDURE — 4004F PT TOBACCO SCREEN RCVD TLK: CPT | Performed by: INTERNAL MEDICINE

## 2021-10-19 PROCEDURE — 99214 OFFICE O/P EST MOD 30 MIN: CPT | Performed by: INTERNAL MEDICINE

## 2021-10-19 PROCEDURE — G8417 CALC BMI ABV UP PARAM F/U: HCPCS | Performed by: INTERNAL MEDICINE

## 2021-10-19 RX ORDER — RIMEGEPANT SULFATE 75 MG/75MG
75 TABLET, ORALLY DISINTEGRATING ORAL
Qty: 8 TABLET | Refills: 1 | Status: SHIPPED | OUTPATIENT
Start: 2021-10-19 | End: 2022-01-17

## 2021-10-19 ASSESSMENT — ENCOUNTER SYMPTOMS
BLOOD IN STOOL: 0
NAUSEA: 0
SORE THROAT: 0
COUGH: 0
VOMITING: 0
SHORTNESS OF BREATH: 0
ABDOMINAL PAIN: 0

## 2021-10-19 NOTE — PROGRESS NOTES
10/19/2021    Shobha Nguyen (:  1995) is a 27 y.o. female, here for a preventive medicine evaluation. Patient Active Problem List   Diagnosis    Obesity (BMI 35.0-39.9 without comorbidity)    Migraine    Hypertriglyceridemia    Seasonal allergic rhinitis due to pollen    Congenital anomaly of ureter    Difficulty concentrating    Easy bruising    Insomnia    Myopia    Pancreatitis    Regular astigmatism       Review of Systems   Constitutional: Negative for fatigue and fever. HENT: Negative for nosebleeds and sore throat. Respiratory: Negative for cough and shortness of breath. Cardiovascular: Negative for chest pain, palpitations and leg swelling. Gastrointestinal: Negative for abdominal pain, blood in stool, nausea and vomiting. Neurological: Negative for dizziness and weakness. Prior to Visit Medications    Medication Sig Taking? Authorizing Provider   cetirizine (ZYRTEC) 10 MG tablet TAKE 1 TABLET BY MOUTH EVERY DAY Yes Manju Arboleda MD   Omega-3 Fatty Acids (FISH OIL) 1000 MG CAPS Take 1 capsule by mouth 3 times daily Yes Manju Arboleda MD   fluticasone (FLONASE) 50 MCG/ACT nasal spray 2 sprays by Each Nostril route daily  Patient not taking: Reported on 2021  Manju Arboleda MD        No Known Allergies    Past Medical History:   Diagnosis Date    Eczema     Headache     Migraine     Preoperative clearance        History reviewed. No pertinent surgical history. Social History     Socioeconomic History    Marital status: Single     Spouse name: Not on file    Number of children: Not on file    Years of education: Not on file    Highest education level: Not on file   Occupational History    Not on file   Tobacco Use    Smoking status: Current Every Day Smoker     Types: Cigarettes    Smokeless tobacco: Never Used   Vaping Use    Vaping Use: Never used   Substance and Sexual Activity    Alcohol use:  Yes    Drug use: Yes     Types: Marijuana  Sexual activity: Not on file   Other Topics Concern    Not on file   Social History Narrative    Not on file     Social Determinants of Health     Financial Resource Strain: Low Risk     Difficulty of Paying Living Expenses: Not hard at all   Food Insecurity: No Food Insecurity    Worried About Running Out of Food in the Last Year: Never true    [address] in the Last Year: Never true   Transportation Needs:     Lack of Transportation (Medical):  Lack of Transportation (Non-Medical):    Physical Activity:     Days of Exercise per Week:     Minutes of Exercise per Session:    Stress:     Feeling of Stress :    Social Connections:     Frequency of Communication with Friends and Family:     Frequency of Social Gatherings with Friends and Family:     Attends Sikhism Services:     Active Member of Clubs or Organizations:     Attends Club or Organization Meetings:     Marital Status:    Intimate Partner Violence:     Fear of Current or Ex-Partner:     Emotionally Abused:     Physically Abused:     Sexually Abused:         Family History   Problem Relation Age of Onset    High Blood Pressure Mother     Alcohol Abuse Father        ADVANCE DIRECTIVE: N, <no information>    Vitals:    10/19/21 0937   BP: 120/82   SpO2: 97%   Weight: 238 lb (108 kg)   Height: 5' 7\" (1.702 m)     Estimated body mass index is 37.28 kg/m² as calculated from the following:    Height as of this encounter: 5' 7\" (1.702 m). Weight as of this encounter: 238 lb (108 kg). Physical Exam  Constitutional:       Appearance: Normal appearance. HENT:      Head: Normocephalic and atraumatic. Eyes:      General: No scleral icterus. Conjunctiva/sclera: Conjunctivae normal.   Cardiovascular:      Rate and Rhythm: Normal rate and regular rhythm. Pulses: Normal pulses. Heart sounds: Normal heart sounds. Pulmonary:      Effort: Pulmonary effort is normal.      Breath sounds: Normal breath sounds. Musculoskeletal:         General: No swelling. Skin:     General: Skin is warm and dry. Neurological:      Mental Status: She is alert and oriented to person, place, and time. Mental status is at baseline. Psychiatric:         Mood and Affect: Mood normal.         Behavior: Behavior normal.         No flowsheet data found. Lab Results   Component Value Date    CHOL 149 11/16/2020    CHOL 84 02/28/2012    TRIG 481 11/16/2020    TRIG 56 02/28/2012    HDL 40 11/16/2020    HDL 42 02/28/2012    LDLCALC see below 11/16/2020    LDLCALC 31 02/28/2012    GLUCOSE 87 11/16/2020    LABA1C 4.9 11/16/2020       The ASCVD Risk score (Tyler Councilman., et al., 2013) failed to calculate for the following reasons:     The 2013 ASCVD risk score is only valid for ages 36 to 78    Immunization History   Administered Date(s) Administered    DTP 1995, 1995, 1993, 09/23/1994, 08/14/2000    HPV Quadrivalent (Gardasil) 12/08/2008, 03/12/2009, 10/06/2009    Hepatitis B Ped/Adol (Engerix-B, Recombivax HB) 05/04/2000, 06/05/2000, 12/07/2000    Hib vaccine 1993, 1993, 01/20/1994, 09/23/1994    Influenza Vaccine, unspecified formulation 12/08/2008, 10/06/2009, 09/24/2013    Influenza Virus Vaccine 09/24/2013    MMR 09/23/1994, 05/04/2000    Meningococcal MCV4P (Menactra) 03/12/2009    Polio IPV (IPOL) 1993, 1993, 01/20/1994, 08/14/2000    Polio OPV 09/23/1994    Tdap (Boostrix, Adacel) 08/16/2017       Health Maintenance   Topic Date Due    Hepatitis C screen  Never done    Pneumococcal 0-64 years Vaccine (1 of 2 - PPSV23) Never done    COVID-19 Vaccine (1) Never done    Pap smear  Never done    Flu vaccine (1) 09/01/2021    DTaP/Tdap/Td vaccine (7 - Td or Tdap) 08/16/2027    Hepatitis B vaccine  Completed    Hib vaccine  Completed    Hepatitis A vaccine  Aged Out    Meningococcal (ACWY) vaccine  Aged Out    Varicella vaccine  Discontinued    HIV screen  Discontinued ASSESSMENT/PLAN:  1. Encounter for well adult exam with abnormal findings  -     CBC Auto Differential; Future  -     Comprehensive Metabolic Panel; Future  -     Hepatic Function Panel; Future  -     Hemoglobin A1C; Future  -     Hepatitis C Antibody; Future  -     Lipid Panel; Future  -     TSH with Reflex; Future  2. Hypertriglyceridemia  3. Vitamin D deficiency  -     Vitamin D 25 Hydroxy; Future      Return in about 3 months (around 1/19/2022) for Hyperlipidemia. An electronic signature was used to authenticate this note.     --Sheldon Resendiz MD on 10/19/2021 at 9:50 AM

## 2021-10-19 NOTE — PROGRESS NOTES
Segundo Palma (:  1995) is a 27 y.o. female, Established patient, here for evaluation of the following chief complaint(s):  Established New Doctor, Annual Exam, and Migraine         ASSESSMENT/PLAN:  1. Migraine without status migrainosus, not intractable, unspecified migraine type: Uncontrolled  Not responding to previous treatments. Started on Nurtec 75mg PRN for acute attacks  2. Hypertriglyceridemia  Uncontrolled  Check lipid panel today  3. Vitamin D deficiency  -     Vitamin D 25 Hydroxy; Future      Return in about 3 months (around 2022) for Hyperlipidemia. Subjective   SUBJECTIVE/OBJECTIVE:  Vitamin D deficiency:  Patient ran out of vitamin D supplements and not taking. Migraine   This is a chronic problem. The current episode started more than 1 year ago. The problem occurs intermittently. The problem has been gradually worsening. The pain does not radiate. The pain quality is similar to prior headaches. The quality of the pain is described as aching. The pain is at a severity of 8/10. The pain is severe. Pertinent negatives include no abdominal pain, coughing, dizziness, fever, nausea, sore throat, vomiting or weakness. She has tried Excedrin for the symptoms. The treatment provided moderate relief. Her past medical history is significant for obesity. Hyperlipidemia  This is a chronic problem. The current episode started more than 1 year ago. The problem is uncontrolled. Recent lipid tests were reviewed and are high. Exacerbating diseases include obesity. Pertinent negatives include no chest pain or shortness of breath. Current antihyperlipidemic treatment includes fibric acid derivatives. Risk factors for coronary artery disease include dyslipidemia and obesity. Review of Systems   Constitutional: Negative for fatigue and fever. HENT: Negative for nosebleeds and sore throat. Respiratory: Negative for cough and shortness of breath.     Cardiovascular: Negative for chest pain, palpitations and leg swelling. Gastrointestinal: Negative for abdominal pain, blood in stool, nausea and vomiting. Neurological: Positive for headaches. Negative for dizziness and weakness. Objective   Physical Exam  Constitutional:       Appearance: Normal appearance. She is obese. HENT:      Head: Normocephalic and atraumatic. Eyes:      General: No scleral icterus. Conjunctiva/sclera: Conjunctivae normal.   Cardiovascular:      Rate and Rhythm: Normal rate and regular rhythm. Pulses: Normal pulses. Heart sounds: Normal heart sounds. Pulmonary:      Effort: Pulmonary effort is normal.      Breath sounds: Normal breath sounds. Musculoskeletal:         General: No swelling. Skin:     General: Skin is warm and dry. Neurological:      Mental Status: She is alert and oriented to person, place, and time. Mental status is at baseline. Psychiatric:         Mood and Affect: Mood normal.         Behavior: Behavior normal.              An electronic signature was used to authenticate this note.     --Milly Pepper MD

## 2021-10-20 DIAGNOSIS — E55.9 VITAMIN D DEFICIENCY: Primary | ICD-10-CM

## 2021-10-20 LAB
ESTIMATED AVERAGE GLUCOSE: 93.9 MG/DL
HBA1C MFR BLD: 4.9 %

## 2021-10-20 RX ORDER — MELATONIN
1000 DAILY
Qty: 90 TABLET | Refills: 0 | Status: SHIPPED | OUTPATIENT
Start: 2021-10-20

## 2021-11-03 ENCOUNTER — TELEPHONE (OUTPATIENT)
Dept: INTERNAL MEDICINE CLINIC | Age: 26
End: 2021-11-03

## 2021-11-04 ENCOUNTER — TELEPHONE (OUTPATIENT)
Dept: ADMINISTRATIVE | Age: 26
End: 2021-11-04

## 2021-11-05 NOTE — TELEPHONE ENCOUNTER
The medication is DENIED; Fernando Christopher 139 letter attached. If this requires a response please respond to the pool ( [id number] [address]). Thank you please advise patient.

## 2021-11-08 NOTE — TELEPHONE ENCOUNTER
Nurtec prescription not approved by patient's insurance. If patient continues to have headaches we can see her in the clinic to discuss other treatment options for migraine.

## 2022-01-17 ENCOUNTER — OFFICE VISIT (OUTPATIENT)
Dept: INTERNAL MEDICINE CLINIC | Age: 27
End: 2022-01-17
Payer: COMMERCIAL

## 2022-01-17 VITALS
WEIGHT: 245.8 LBS | DIASTOLIC BLOOD PRESSURE: 80 MMHG | HEIGHT: 67 IN | SYSTOLIC BLOOD PRESSURE: 124 MMHG | HEART RATE: 100 BPM | BODY MASS INDEX: 38.58 KG/M2 | OXYGEN SATURATION: 95 %

## 2022-01-17 DIAGNOSIS — M79.672 CHRONIC PAIN OF BOTH FEET: ICD-10-CM

## 2022-01-17 DIAGNOSIS — G43.909 MIGRAINE WITHOUT STATUS MIGRAINOSUS, NOT INTRACTABLE, UNSPECIFIED MIGRAINE TYPE: Primary | ICD-10-CM

## 2022-01-17 DIAGNOSIS — E55.9 VITAMIN D DEFICIENCY: ICD-10-CM

## 2022-01-17 DIAGNOSIS — E78.1 HYPERTRIGLYCERIDEMIA: ICD-10-CM

## 2022-01-17 DIAGNOSIS — M79.671 CHRONIC PAIN OF BOTH FEET: ICD-10-CM

## 2022-01-17 DIAGNOSIS — G89.29 CHRONIC PAIN OF BOTH FEET: ICD-10-CM

## 2022-01-17 PROCEDURE — G8417 CALC BMI ABV UP PARAM F/U: HCPCS | Performed by: INTERNAL MEDICINE

## 2022-01-17 PROCEDURE — G8427 DOCREV CUR MEDS BY ELIG CLIN: HCPCS | Performed by: INTERNAL MEDICINE

## 2022-01-17 PROCEDURE — G8484 FLU IMMUNIZE NO ADMIN: HCPCS | Performed by: INTERNAL MEDICINE

## 2022-01-17 PROCEDURE — 4004F PT TOBACCO SCREEN RCVD TLK: CPT | Performed by: INTERNAL MEDICINE

## 2022-01-17 PROCEDURE — 99214 OFFICE O/P EST MOD 30 MIN: CPT | Performed by: INTERNAL MEDICINE

## 2022-01-17 ASSESSMENT — ENCOUNTER SYMPTOMS
VOMITING: 0
SORE THROAT: 0
NAUSEA: 0
ABDOMINAL PAIN: 0
COUGH: 0
BLOOD IN STOOL: 0
SHORTNESS OF BREATH: 0

## 2022-01-17 NOTE — PROGRESS NOTES
Temitope Rod (:  1995) is a 27 y.o. female, Established patient, here for evaluation of the following chief complaint(s):  Hyperlipidemia         ASSESSMENT/PLAN:  1. Chronic pain of both feet  -     External Referral To Orthopedic Surgery      No follow-ups on file. Subjective   SUBJECTIVE/OBJECTIVE:  HPI    Review of Systems       Objective   Physical Exam         An electronic signature was used to authenticate this note.     --Sherman Givens MD

## 2022-01-17 NOTE — PROGRESS NOTES
Shelbi Romeo (:  1995) is a 27 y.o. female, Established patient, here for evaluation of the following chief complaint(s):  Hyperlipidemia         ASSESSMENT/PLAN:  1. Migraine without status migrainosus, not intractable, unspecified migraine type  - Stable   - Continue same management with as needed Ibuprofen  2. Chronic pain of both feet  -     External Referral To Orthopedic Surgery  3. Hypertriglyceridemia  - Continue lifestyle modifications  4. Vitamin D deficiency  - Stable   - Continue same management with vitamin D 3 supplements. Return in about 3 months (around 2022). Subjective   SUBJECTIVE/OBJECTIVE:  Vitamin D deficiency:  Stable on vitamin D3 supplement. Hyperlipidemia  This is a chronic problem. The current episode started more than 1 year ago. The problem is controlled. Exacerbating diseases include obesity. Pertinent negatives include no chest pain or shortness of breath. Current antihyperlipidemic treatment includes diet change and exercise. The current treatment provides significant improvement of lipids. There are no compliance problems. Risk factors for coronary artery disease include dyslipidemia and obesity. Migraine   This is a chronic problem. The current episode started more than 1 year ago. The problem occurs monthly. The problem has been gradually improving. The pain does not radiate. The pain quality is similar to prior headaches. The quality of the pain is described as aching. The pain is moderate. Pertinent negatives include no abdominal pain, coughing, dizziness, fever, nausea, sore throat, vomiting or weakness. She has tried NSAIDs for the symptoms. The treatment provided significant relief. Her past medical history is significant for obesity. Review of Systems   Constitutional: Negative for fatigue and fever. HENT: Negative for nosebleeds and sore throat. Respiratory: Negative for cough and shortness of breath.     Cardiovascular: Negative for chest pain, palpitations and leg swelling. Gastrointestinal: Negative for abdominal pain, blood in stool, nausea and vomiting. Neurological: Negative for dizziness and weakness. Objective   Physical Exam  Constitutional:       Appearance: Normal appearance. HENT:      Head: Normocephalic and atraumatic. Eyes:      General: No scleral icterus. Conjunctiva/sclera: Conjunctivae normal.   Cardiovascular:      Rate and Rhythm: Normal rate and regular rhythm. Pulses: Normal pulses. Heart sounds: Normal heart sounds. Pulmonary:      Effort: Pulmonary effort is normal.      Breath sounds: Normal breath sounds. Musculoskeletal:         General: No swelling. Skin:     General: Skin is warm and dry. Neurological:      Mental Status: She is alert and oriented to person, place, and time. Mental status is at baseline. Psychiatric:         Mood and Affect: Mood normal.         Behavior: Behavior normal.              An electronic signature was used to authenticate this note.     --Jeff El MD